# Patient Record
Sex: FEMALE | Race: WHITE | NOT HISPANIC OR LATINO | URBAN - METROPOLITAN AREA
[De-identification: names, ages, dates, MRNs, and addresses within clinical notes are randomized per-mention and may not be internally consistent; named-entity substitution may affect disease eponyms.]

---

## 2024-07-31 ENCOUNTER — HOSPITAL ENCOUNTER (EMERGENCY)
Facility: HOSPITAL | Age: 54
Discharge: OTHER INSTITUTION | End: 2024-08-01
Attending: EMERGENCY MEDICINE
Payer: COMMERCIAL

## 2024-07-31 ENCOUNTER — APPOINTMENT (OUTPATIENT)
Dept: CARDIOLOGY | Facility: HOSPITAL | Age: 54
End: 2024-07-31
Payer: COMMERCIAL

## 2024-07-31 VITALS
TEMPERATURE: 98.2 F | OXYGEN SATURATION: 96 % | WEIGHT: 160 LBS | HEART RATE: 57 BPM | RESPIRATION RATE: 18 BRPM | SYSTOLIC BLOOD PRESSURE: 143 MMHG | HEIGHT: 65 IN | DIASTOLIC BLOOD PRESSURE: 84 MMHG | BODY MASS INDEX: 26.66 KG/M2

## 2024-07-31 DIAGNOSIS — R45.851 DEPRESSION WITH SUICIDAL IDEATION: Primary | ICD-10-CM

## 2024-07-31 DIAGNOSIS — F32.A DEPRESSION WITH SUICIDAL IDEATION: Primary | ICD-10-CM

## 2024-07-31 LAB
ALBUMIN SERPL BCP-MCNC: 4.7 G/DL (ref 3.4–5)
ALP SERPL-CCNC: 91 U/L (ref 33–110)
ALT SERPL W P-5'-P-CCNC: 11 U/L (ref 7–45)
AMPHETAMINES UR QL SCN: ABNORMAL
ANION GAP SERPL CALC-SCNC: 15 MMOL/L (ref 10–20)
APAP SERPL-MCNC: <10 UG/ML
AST SERPL W P-5'-P-CCNC: 15 U/L (ref 9–39)
BARBITURATES UR QL SCN: ABNORMAL
BASOPHILS # BLD AUTO: 0.06 X10*3/UL (ref 0–0.1)
BASOPHILS NFR BLD AUTO: 0.9 %
BENZODIAZ UR QL SCN: ABNORMAL
BILIRUB SERPL-MCNC: 0.6 MG/DL (ref 0–1.2)
BUN SERPL-MCNC: 6 MG/DL (ref 6–23)
BZE UR QL SCN: ABNORMAL
CALCIUM SERPL-MCNC: 10 MG/DL (ref 8.6–10.3)
CANNABINOIDS UR QL SCN: ABNORMAL
CHLORIDE SERPL-SCNC: 104 MMOL/L (ref 98–107)
CO2 SERPL-SCNC: 24 MMOL/L (ref 21–32)
CREAT SERPL-MCNC: 0.9 MG/DL (ref 0.5–1.05)
EGFRCR SERPLBLD CKD-EPI 2021: 77 ML/MIN/1.73M*2
EOSINOPHIL # BLD AUTO: 0.19 X10*3/UL (ref 0–0.7)
EOSINOPHIL NFR BLD AUTO: 2.8 %
ERYTHROCYTE [DISTWIDTH] IN BLOOD BY AUTOMATED COUNT: 12.7 % (ref 11.5–14.5)
ETHANOL SERPL-MCNC: <10 MG/DL
FENTANYL+NORFENTANYL UR QL SCN: ABNORMAL
GLUCOSE SERPL-MCNC: 126 MG/DL (ref 74–99)
HCG UR QL IA.RAPID: NEGATIVE
HCT VFR BLD AUTO: 43.8 % (ref 36–46)
HGB BLD-MCNC: 15.1 G/DL (ref 12–16)
IMM GRANULOCYTES # BLD AUTO: 0 X10*3/UL (ref 0–0.7)
IMM GRANULOCYTES NFR BLD AUTO: 0 % (ref 0–0.9)
LYMPHOCYTES # BLD AUTO: 3.33 X10*3/UL (ref 1.2–4.8)
LYMPHOCYTES NFR BLD AUTO: 49.1 %
MCH RBC QN AUTO: 32.5 PG (ref 26–34)
MCHC RBC AUTO-ENTMCNC: 34.5 G/DL (ref 32–36)
MCV RBC AUTO: 94 FL (ref 80–100)
METHADONE UR QL SCN: ABNORMAL
MONOCYTES # BLD AUTO: 0.37 X10*3/UL (ref 0.1–1)
MONOCYTES NFR BLD AUTO: 5.5 %
NEUTROPHILS # BLD AUTO: 2.83 X10*3/UL (ref 1.2–7.7)
NEUTROPHILS NFR BLD AUTO: 41.7 %
NRBC BLD-RTO: 0 /100 WBCS (ref 0–0)
OPIATES UR QL SCN: ABNORMAL
OXYCODONE+OXYMORPHONE UR QL SCN: ABNORMAL
PCP UR QL SCN: ABNORMAL
PLATELET # BLD AUTO: 241 X10*3/UL (ref 150–450)
POTASSIUM SERPL-SCNC: 3.6 MMOL/L (ref 3.5–5.3)
PROT SERPL-MCNC: 7.4 G/DL (ref 6.4–8.2)
RBC # BLD AUTO: 4.65 X10*6/UL (ref 4–5.2)
SALICYLATES SERPL-MCNC: <3 MG/DL
SODIUM SERPL-SCNC: 139 MMOL/L (ref 136–145)
WBC # BLD AUTO: 6.8 X10*3/UL (ref 4.4–11.3)

## 2024-07-31 PROCEDURE — 81025 URINE PREGNANCY TEST: CPT | Performed by: EMERGENCY MEDICINE

## 2024-07-31 PROCEDURE — 2500000002 HC RX 250 W HCPCS SELF ADMINISTERED DRUGS (ALT 637 FOR MEDICARE OP, ALT 636 FOR OP/ED): Performed by: EMERGENCY MEDICINE

## 2024-07-31 PROCEDURE — 85025 COMPLETE CBC W/AUTO DIFF WBC: CPT | Performed by: EMERGENCY MEDICINE

## 2024-07-31 PROCEDURE — 80143 DRUG ASSAY ACETAMINOPHEN: CPT | Performed by: EMERGENCY MEDICINE

## 2024-07-31 PROCEDURE — 93005 ELECTROCARDIOGRAM TRACING: CPT

## 2024-07-31 PROCEDURE — 36415 COLL VENOUS BLD VENIPUNCTURE: CPT | Performed by: EMERGENCY MEDICINE

## 2024-07-31 PROCEDURE — 99285 EMERGENCY DEPT VISIT HI MDM: CPT

## 2024-07-31 PROCEDURE — 84075 ASSAY ALKALINE PHOSPHATASE: CPT | Performed by: EMERGENCY MEDICINE

## 2024-07-31 PROCEDURE — 80307 DRUG TEST PRSMV CHEM ANLYZR: CPT | Performed by: EMERGENCY MEDICINE

## 2024-07-31 PROCEDURE — S4991 NICOTINE PATCH NONLEGEND: HCPCS | Performed by: EMERGENCY MEDICINE

## 2024-07-31 RX ORDER — IBUPROFEN 200 MG
1 TABLET ORAL ONCE
Status: DISCONTINUED | OUTPATIENT
Start: 2024-07-31 | End: 2024-08-01 | Stop reason: HOSPADM

## 2024-07-31 RX ADMIN — NICOTINE 1 PATCH: 21 PATCH, EXTENDED RELEASE TRANSDERMAL at 19:20

## 2024-07-31 SDOH — HEALTH STABILITY: MENTAL HEALTH: HAVE YOU WISHED YOU WERE DEAD OR WISHED YOU COULD GO TO SLEEP AND NOT WAKE UP?: YES

## 2024-07-31 SDOH — HEALTH STABILITY: MENTAL HEALTH

## 2024-07-31 SDOH — HEALTH STABILITY: MENTAL HEALTH: NON-SPECIFIC ACTIVE SUICIDAL THOUGHTS (PAST 1 MONTH): YES

## 2024-07-31 SDOH — HEALTH STABILITY: MENTAL HEALTH: SUICIDAL BEHAVIOR (LIFETIME): YES

## 2024-07-31 SDOH — HEALTH STABILITY: MENTAL HEALTH: HAVE YOU BEEN THINKING ABOUT HOW YOU MIGHT DO THIS?: NO

## 2024-07-31 SDOH — HEALTH STABILITY: MENTAL HEALTH: HAVE YOU ACTUALLY HAD ANY THOUGHTS OF KILLING YOURSELF?: YES

## 2024-07-31 SDOH — HEALTH STABILITY: MENTAL HEALTH: HAVE YOU EVER DONE ANYTHING, STARTED TO DO ANYTHING, OR PREPARED TO DO ANYTHING TO END YOUR LIFE?: NO

## 2024-07-31 SDOH — ECONOMIC STABILITY: HOUSING INSECURITY: FEELS SAFE LIVING IN HOME: YES

## 2024-07-31 SDOH — HEALTH STABILITY: MENTAL HEALTH: SUICIDE ASSESSMENT: ADULT (C-SSRS)

## 2024-07-31 SDOH — HEALTH STABILITY: MENTAL HEALTH: ACTIVE SUICIDAL IDEATION WITH SOME INTENT TO ACT, WITHOUT SPECIFIC PLAN (PAST 1 MONTH): NO

## 2024-07-31 SDOH — HEALTH STABILITY: MENTAL HEALTH
HAVE YOU STARTED TO WORK OUT OR WORKED OUT THE DETAILS OF HOW TO KILL YOURSELF? DO YOU INTENT TO CARRY OUT THIS PLAN?: YES

## 2024-07-31 SDOH — HEALTH STABILITY: MENTAL HEALTH: ANXIETY SYMPTOMS: GENERALIZED

## 2024-07-31 SDOH — HEALTH STABILITY: MENTAL HEALTH: ACTIVE SUICIDAL IDEATION WITH SPECIFIC PLAN AND INTENT (PAST 1 MONTH): NO

## 2024-07-31 SDOH — HEALTH STABILITY: MENTAL HEALTH: BEHAVIORS/MOOD: ANXIOUS;ANGRY;CRYING

## 2024-07-31 SDOH — HEALTH STABILITY: MENTAL HEALTH: SUICIDAL BEHAVIOR (3 MONTHS): YES

## 2024-07-31 SDOH — HEALTH STABILITY: MENTAL HEALTH: WISH TO BE DEAD (PAST 1 MONTH): YES

## 2024-07-31 SDOH — HEALTH STABILITY: MENTAL HEALTH: HAVE YOU HAD THESE THOUGHTS AND HAD SOME INTENTION OF ACTING ON THEM?: NO

## 2024-07-31 SDOH — HEALTH STABILITY: MENTAL HEALTH
DEPRESSION SYMPTOMS: APPETITE CHANGE;CRYING;FEELINGS OF HELPLESSNESS;FEELINGS OF HOPELESSESS;FEELINGS OF WORTHLESSNESS;INCREASED IRRITABILITY;ISOLATIVE;LOSS OF INTEREST;SLEEP DISTURBANCE

## 2024-07-31 ASSESSMENT — COLUMBIA-SUICIDE SEVERITY RATING SCALE - C-SSRS
1. IN THE PAST MONTH, HAVE YOU WISHED YOU WERE DEAD OR WISHED YOU COULD GO TO SLEEP AND NOT WAKE UP?: YES
6. HAVE YOU EVER DONE ANYTHING, STARTED TO DO ANYTHING, OR PREPARED TO DO ANYTHING TO END YOUR LIFE?: YES
4. HAVE YOU HAD THESE THOUGHTS AND HAD SOME INTENTION OF ACTING ON THEM?: NO
2. HAVE YOU ACTUALLY HAD ANY THOUGHTS OF KILLING YOURSELF?: YES
5. HAVE YOU STARTED TO WORK OUT OR WORKED OUT THE DETAILS OF HOW TO KILL YOURSELF? DO YOU INTEND TO CARRY OUT THIS PLAN?: NO
6. HAVE YOU EVER DONE ANYTHING, STARTED TO DO ANYTHING, OR PREPARED TO DO ANYTHING TO END YOUR LIFE?: YES

## 2024-07-31 ASSESSMENT — LIFESTYLE VARIABLES
PRESCIPTION_ABUSE_PAST_12_MONTHS: NO
SUBSTANCE_ABUSE_PAST_12_MONTHS: NO

## 2024-07-31 ASSESSMENT — PAIN - FUNCTIONAL ASSESSMENT: PAIN_FUNCTIONAL_ASSESSMENT: 0-10

## 2024-07-31 ASSESSMENT — PAIN SCALES - GENERAL: PAINLEVEL_OUTOF10: 0 - NO PAIN

## 2024-07-31 NOTE — PROGRESS NOTES
EPAT - Social Work Psychiatric Assessment    Arrival Details  Mode of Arrival: Ambulance  Admission Source:  (Work)  Admission Type: Involuntary  EPAT Assessment Start Date: 07/31/24  EPAT Assessment Start Time: 1615  Name of : RAYA Larkin LSW    History of Present Illness  Admission Reason: Suicidal Ideation  HPI: Patient is a 53yo female presenting to the ED via EMS on a pink slip from work with chief complaint of suicidal ideation. Reportedly, “she made comments of wanting to harm herself by using a meat ignacio to cut her wrist and to drive head on with another vehicle”. Patient’s chart and triage reviewed, provider note not available. Patient has a psychiatric history of Anxiety and Depression, is currently on Citalopram. She is not connected with an outpatient provider and has no history of inpatient admissions. Patient denies history of self-harm or suicide attempts, indicated high risk at triage. UTOX and BAL not available, however, conversation conducted with ED staff prior to assessment indicated patient was clinically sober on arrival with no signs of intoxication.    SW Readmission Information   Readmission within 30 Days: No    Psychiatric Symptoms  Anxiety Symptoms: Generalized  Depression Symptoms: Appetite change, Crying, Feelings of helplessness, Feelings of hopelessess, Feelings of worthlessness, Increased irritability, Isolative, Loss of interest, Sleep disturbance  Ashley Symptoms: No problems reported or observed.    Psychosis Symptoms  Hallucination Type: No problems reported or observed.  Delusion Type: No problems reported or observed.    Additional Symptoms - Adult  Generalized Anxiety Disorder: Difficult to control worry, Easily fatigued, Excessive anxiety/worry, Irritability, Restlessness, Sleep disturbance  Obsessive Compulsive Disorder: No problems reported or observed.  Panic Attack: No problems reported or observed.  Post Traumatic Stress Disorder: No problems reported or  "observed.  Delirium: No problems reported or observed.    Past Psychiatric History/Meds/Treatments  Past Psychiatric History: Psychiatric Diagnosis: Anxiety, Depression // Current MH Center: none // Previous Admissions: denies  Past Psychiatric Meds/Treatments: Current medication: Citalopram 40mg  Past Violence/Victimization History: Denies    Current Mental Health Contacts   Name/Phone Number: None   Last Appointment Date: None  Provider Name/Phone Number: None - pt recently terminated services with her PCP so no longer has a medical or mental health provider  Provider Last Appointment Date: None    Support System:  (\"I don't need anybody\")    Living Arrangement: House, Lives with someone    Home Safety  Feels Safe Living in Home: Yes    Income Information  Employment Status for: Patient  Employment Status: Employed  Income Source: Employed  Current/Previous Occupation: Healthcare    [place] Service/Education History  Current or Previous  Service: None  Education Level: High school  History of Learning Problems: No  History of School Behavior Problems: No    Social/Cultural History  Social History: [place] Citizen: yes // Payee: None // Guardian/POA: Self  Cultural Requests During Hospitalization: none  Spiritual Requests During Hospitalization: none  Important Activities:  (\"Nothing\")    Legal  Legal Considerations: Patient/ Family Capacity to Make Sound Judgments  Criminal Activity/ Legal Involvement Pertinent to Current Situation/ Hospitalization: None  Legal Concerns: denies    Drug Screening  Have you used any substances (canabis, cocaine, heroin, hallucinogens, inhalants, etc.) in the past 12 months?: No  Have you used any prescription drugs other than prescribed in the past 12 months?: No  Is a toxicology screen needed?: Yes    Stage of Change  Stage of Change:  (n/a)    Psychosocial  Psychosocial (WDL): Exceptions to WDL  Behaviors/Mood: Anxious, Cooperative, Crying, " Irritable  Affect: Appropriate to circumstances  Parent/Guardian/Significant Other Involvement: Attentive to patient needs  Family Behaviors: Appropriate for situation    Orientation  Orientation Level: Oriented X4    General Appearance  Motor Activity: Unremarkable  Speech Pattern:  (Unremarkable)  General Attitude: Cooperative, Guarded  Appearance/Hygiene: Unremarkable    Thought Process  Coherency: [place] thinking  Content: Blaming self  Delusions:  (None)  Perception: Not altered  Hallucination: None  Judgment/Insight: Impaired  Confusion: None  Cognition: Poor judgement, Poor safety awareness, Appropriate attention/concentration    Sleep Pattern  Sleep Pattern: Disturbed/interrupted sleep, Restlessness    Risk Factors  Self Harm/Suicidal Ideation Plan: SI with plan to drive car into traffic or cut self with knife  Previous Self Harm/Suicidal Plans: Denies  Risk Factors: Major mental illness    Violence Risk Assessment  Assessment of Violence: None noted  Thoughts of Harm to Others: No    Ability to Assess Risk Screen  Risk Screen - Ability to Assess: Able to be screened  [place] Suicide Severity Rating Scale (Screener/Recent Self-Report)  1. Wish to be Dead (Past 1 Month): Yes  2. Non-Specific Active Suicidal Thoughts (Past 1 Month): Yes  3. Active Suicidal Ideation with any Methods (Not Plan) Without Intent to Act (Past 1 Month): Yes  4. Active Suicidal Ideation with Some Intent to Act, Without Specific Plan (Past 1 Month): No  5. Active Suicidal Ideation with Specific Plan and Intent (Past 1 Month): No  6. Suicidal Behavior (Lifetime): Yes  6. Suicidal Behavior (3 Months): Yes  6. Suicidal Behavior (Description): Pt reports holding knife with thoughts to cut wrists yesterday  Calculated C-SSRS Risk Score (Lifetime/Recent): High Risk  Step 1: Risk Factors  Current & Past Psychiatric Dx: Mood disorder  Presenting Symptoms: Anhedonia, Impulsivity, Hopelessness or despair, Anxiety and/or  panic  Precipitants/Stressors: Chronic physical pain or other acute medical problem (e.g. CNS disorders), Social isolation, Perceived burden on others (24/7 tinnitus 2/2 meniere's disease)  Change in Treatment: Non-compliant or not receiving treatment  Access to Lethal Methods : No  Step 2: Protective Factors   Protective Factors Internal: Identifies reasons for living  Protective Factors External: Responsibility to children  Step 3: Suicidal Ideation Intensity  How Many Times Have You Had These Thoughts: Many times each day  When You Have the Thoughts How Long do They Last : 4-8 hours/most of the day  Could/Can You Stop Thinking About Killing Yourself or Wanting to Die if You Want to: Can control thoughts with some difficulty  Are There Things - Anyone or Anything - That Stopped You From Wanting to Die or Acting on: Uncertain that deterrents stopped you  What Sort of Reasons Did You Have For Thinking About Wanting to Die or Killing Yourself: Equally to get attention, revenge or a reaction from others and to end/stop the pain  Total Score: 18  Step 5: Documentation  Risk Level: Moderate suicide risk (Patient remained an elevated risk, continued SI with plan. However, this risk is able to be mitigated in the setting of an inpatient setting & pt is currently considered moderate risk with plan for inpatient admission. Discussed w/ Dr. Baron)    Psychiatric Impression and Plan of Care  Assessment and Plan:    Upon assessment the patient was withdrawn, guarded, and irritable but remained in behavioral control. Patient presented as acutely dysthymic, anhedonic, and was crying throughout. The patient reports she is here because “I said things I shouldn't have and now I know not to tell anyone anything ever again”. Patient continued to report SI with plan to “run my car into traffic” or cut herself with a  knife. She reported “I could have done it yesterday, too. I was washing dishes and had a knife in my hand  and could have just slit my wrist right then and there”. When asked what had stopped her, patient responded “if I wasn't successful then I would have an even bigger mess to clean up” and “I guess my family too”. The patient otherwise denied HI/AVH and no delusions were elicited. She was unable to state a specific trigger or stressor to current SI, just that “it's always the same sh*t, my sh*tty life”. 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 She endorses feeling safe at home and does own a firearm. When asked regarding social supports, patient stated “I don't need anybody, so no one. I don't need to be here. Nobody!”. Patient reports 6-7 hours of interrupted sleep per night, reporting it is not restful sleep, and no appetite recently. She endorses high rates of anxiety to the point where she feels debilitated and unable to do anything but go to work and then go back home. Despite requesting to be discharged, patient failed to remain future/goal-oriented and was unable to appropriately participate in efforts to safety plan. The patient reluctantly agreed for this writer to speak with her , reporting that he has not been aware of the severity of her symptoms, “but I guess he'll find out one way or another when I don't come home tonight” - pt declined to elaborate on whether this statement was in reference to her harming herself if she were to be discharged home.     This writer spoke with patient's , Bam Michele. He reports that patient “has not been herself” for about the past week. Patient has been increasingly withdrawn, quiet, irritable, and has demonstrated “no will to do anything”. He expressed belief that patient may benefit from a medication change as she has been on the same medication since they first started dating. He does endorse patient has a firearm and reports that it is not currently locked up  "or secured. He endorses ability to secure it and remove it from the home regardless of patient's disposition.     Diagnostic Impression: Unspecified Depressive Disorder, Unspecified Anxiety Disorder     Psychiatric Impression and Plan for Care: Patient continues to present as an acute risk to self, recommendation for admission discussed with Dr. Baron who is in agreement.     Specific Resources Provided to Patient: Admission    Outcome/Disposition  Patient's Perception of Outcome Achieved: \"I want to go home\"  Assessment, Recommendations and Risk Level Reviewed with: Dr. Baron  Contact Name: Bam Bautista  Contact Number(s): [phone]  Contact Relationship: Spouse  EPAT Assessment Completed Date: 07/31/24  EPAT Assessment Completed Time: 1803      "

## 2024-07-31 NOTE — ED TRIAGE NOTES
Pt presents to ED via EMS for endorsing SI at work. She made comments of wanting to harm herself by using a meat ignacio to cut her wrist and to drive head on with another vehicle. Pt I being pink slipped by [employer] police. Pt states that she hates her life and is miserable. PMH of anxiety and depression.

## 2024-07-31 NOTE — ED PROVIDER NOTES
HPI   Chief Complaint   Patient presents with    Suicidal     Pt brought in via EMS due to endorsing SI at work. She told her coworkers that she wanted to cause harm to herself by using a meat ignacio to cut her wrist and/or drive head on with another vehicle.        54-year-old woman with past medical history of depression and anxiety does present under pink slip by [place] police status post making threats at work.  Threatening to cut her wrists and kill herself.  She denies any active attempts.  She does appear to have worsening depression and anxiety and continues to make threats here in the ED.            Patient History   Past Medical History:   Diagnosis Date    Abrasion of breast, unspecified breast, initial encounter     Breast abrasion    Other specified postprocedural states     Post-operative state    Overweight     Overweight (BMI 25.0-29.9)    Personal history of diseases of the skin and subcutaneous tissue     History of cyst of breast    Personal history of other diseases of the digestive system     History of constipation    Personal history of other medical treatment     History of ultrasound, pelvic    Personal history of other mental and behavioral disorders     History of depression    Personal history of other specified conditions     History of abdominal pain    Unspecified disorder of synovium and tendon, unspecified shoulder     Rotator cuff disorder    Unspecified thoracic, thoracolumbar and lumbosacral intervertebral disc disorder     Disc disorder     Past Surgical History:   Procedure Laterality Date    HYSTEROSCOPY  10/20/2014    Hysteroscopy With Endometrial Ablation    INCISIONAL BREAST BIOPSY  10/20/2014    Incisional Breast Biopsy    LUMBAR LAMINECTOMY  10/18/2016    Laminectomy Lumbar    TONSILLECTOMY  09/24/2014    Tonsillectomy    TUBAL LIGATION  10/20/2014    Tubal Ligation     No family history on file.  Social History     Tobacco Use    Smoking status: Not on file     Smokeless tobacco: Not on file   Substance Use Topics    Alcohol use: Not on file    Drug use: Not on file       Physical Exam   ED Triage Vitals [07/31/24 1552]   Temperature Heart Rate Respirations BP   36.7 °C (98.1 °F) (!) 117 18 (!) 166/99      Pulse Ox Temp Source Heart Rate Source Patient Position   99 % Oral Monitor Sitting      BP Location FiO2 (%)     Left arm --       Physical Exam  Vitals and nursing note reviewed.   Constitutional:       Appearance: Normal appearance. She is normal weight.   HENT:      Head: Normocephalic and atraumatic.      Nose: Nose normal.      Mouth/Throat:      Mouth: Mucous membranes are moist.      Pharynx: Oropharynx is clear.   Eyes:      Extraocular Movements: Extraocular movements intact.      Conjunctiva/sclera: Conjunctivae normal.      Pupils: Pupils are equal, round, and reactive to light.   Cardiovascular:      Rate and Rhythm: Normal rate and regular rhythm.      Pulses: Normal pulses.      Heart sounds: Normal heart sounds.   Pulmonary:      Breath sounds: Normal breath sounds.   Abdominal:      General: Abdomen is flat. Bowel sounds are normal.      Palpations: Abdomen is soft.   Musculoskeletal:         General: Normal range of motion.      Cervical back: Normal range of motion and neck supple.   Skin:     General: Skin is warm and dry.      Capillary Refill: Capillary refill takes less than 2 seconds.   Neurological:      General: No focal deficit present.      Mental Status: She is alert and oriented to person, place, and time. Mental status is at baseline.      Sensory: No sensory deficit.      Motor: No weakness.   Psychiatric:      Comments: Positive ongoing suicidal ideation           ED Course & MDM   Diagnoses as of 07/31/24 1916   Depression with suicidal ideation                       [place] Coma Scale Score: 15                        Medical Decision Making  Labs are drawn including CBC and CMP and tox panel.  All negative.  Drug screen is pending.  She  does not have any signs for acute kidney injury.  No metabolic and neck acidosis.  Blood pressure is stable.  Patient is medically cleared for psychiatric evaluation.  She is evaluated by EPAT he does feel she requires inpatient hospitalization.    EKG interpreted by me showed normal sinus rhythm at a rate of 70 with no acute ischemic changes.  No STEMI.  No A-fib.    Amount and/or Complexity of Data Reviewed  Labs: ordered. Decision-making details documented in ED Course.  Radiology: ordered. Decision-making details documented in ED Course.        Procedure  Procedures     Haroon Baron MD  07/31/24 2929

## 2024-07-31 NOTE — ED NOTES
Pts belongings in locker #3, pants, shirt, purse, shoes and phone     Mandy Faith RN  07/31/24 2378

## 2024-08-01 ENCOUNTER — HOSPITAL ENCOUNTER (INPATIENT)
Facility: HOSPITAL | Age: 54
Discharge: HOME | DRG: 885 | End: 2024-08-01
Attending: PSYCHIATRY & NEUROLOGY | Admitting: PSYCHIATRY & NEUROLOGY
Payer: COMMERCIAL

## 2024-08-01 DIAGNOSIS — F17.200 TOBACCO DEPENDENCE: ICD-10-CM

## 2024-08-01 DIAGNOSIS — R45.851 SUICIDAL IDEATION: Primary | ICD-10-CM

## 2024-08-01 LAB
25(OH)D3 SERPL-MCNC: 17 NG/ML (ref 30–100)
ATRIAL RATE: 70 BPM
CHOLEST SERPL-MCNC: 200 MG/DL (ref 0–199)
CHOLESTEROL/HDL RATIO: 4.8
GLUCOSE P FAST SERPL-MCNC: 92 MG/DL (ref 74–99)
HDLC SERPL-MCNC: 42.1 MG/DL
LDLC SERPL CALC-MCNC: 137 MG/DL
NON HDL CHOLESTEROL: 158 MG/DL (ref 0–149)
P AXIS: 150 DEGREES
P OFFSET: 205 MS
P ONSET: 158 MS
PR INTERVAL: 132 MS
Q ONSET: 224 MS
QRS COUNT: 12 BEATS
QRS DURATION: 78 MS
QT INTERVAL: 412 MS
QTC CALCULATION(BAZETT): 444 MS
QTC FREDERICIA: 433 MS
R AXIS: -20 DEGREES
T AXIS: 138 DEGREES
T OFFSET: 430 MS
TRIGL SERPL-MCNC: 105 MG/DL (ref 0–149)
VENTRICULAR RATE: 70 BPM
VLDL: 21 MG/DL (ref 0–40)

## 2024-08-01 PROCEDURE — 80061 LIPID PANEL: CPT | Performed by: PSYCHIATRY & NEUROLOGY

## 2024-08-01 PROCEDURE — 36415 COLL VENOUS BLD VENIPUNCTURE: CPT | Performed by: PSYCHIATRY & NEUROLOGY

## 2024-08-01 PROCEDURE — 2500000002 HC RX 250 W HCPCS SELF ADMINISTERED DRUGS (ALT 637 FOR MEDICARE OP, ALT 636 FOR OP/ED): Performed by: INTERNAL MEDICINE

## 2024-08-01 PROCEDURE — 2500000001 HC RX 250 WO HCPCS SELF ADMINISTERED DRUGS (ALT 637 FOR MEDICARE OP): Performed by: PSYCHIATRY & NEUROLOGY

## 2024-08-01 PROCEDURE — 99223 1ST HOSP IP/OBS HIGH 75: CPT | Performed by: PSYCHIATRY & NEUROLOGY

## 2024-08-01 PROCEDURE — 82306 VITAMIN D 25 HYDROXY: CPT | Mod: GEALAB | Performed by: PSYCHIATRY & NEUROLOGY

## 2024-08-01 PROCEDURE — 99221 1ST HOSP IP/OBS SF/LOW 40: CPT | Performed by: INTERNAL MEDICINE

## 2024-08-01 PROCEDURE — 82947 ASSAY GLUCOSE BLOOD QUANT: CPT | Performed by: PSYCHIATRY & NEUROLOGY

## 2024-08-01 PROCEDURE — S4991 NICOTINE PATCH NONLEGEND: HCPCS | Performed by: INTERNAL MEDICINE

## 2024-08-01 PROCEDURE — 1240000001 HC SEMI-PRIVATE BH ROOM DAILY

## 2024-08-01 RX ORDER — OLANZAPINE 5 MG/1
10 TABLET ORAL EVERY 6 HOURS PRN
Status: DISCONTINUED | OUTPATIENT
Start: 2024-08-01 | End: 2024-08-01

## 2024-08-01 RX ORDER — TALC
3 POWDER (GRAM) TOPICAL NIGHTLY PRN
Status: DISCONTINUED | OUTPATIENT
Start: 2024-08-01 | End: 2024-08-05 | Stop reason: HOSPADM

## 2024-08-01 RX ORDER — ALPRAZOLAM 0.5 MG/1
0.5 TABLET ORAL 2 TIMES DAILY PRN
Status: DISCONTINUED | OUTPATIENT
Start: 2024-08-01 | End: 2024-08-05 | Stop reason: HOSPADM

## 2024-08-01 RX ORDER — OLANZAPINE 10 MG/2ML
5 INJECTION, POWDER, FOR SOLUTION INTRAMUSCULAR EVERY 6 HOURS PRN
Status: DISCONTINUED | OUTPATIENT
Start: 2024-08-01 | End: 2024-08-01

## 2024-08-01 RX ORDER — OLANZAPINE 5 MG/1
5 TABLET ORAL EVERY 6 HOURS PRN
Status: DISCONTINUED | OUTPATIENT
Start: 2024-08-01 | End: 2024-08-01

## 2024-08-01 RX ORDER — ACETAMINOPHEN 325 MG/1
650 TABLET ORAL ONCE
Status: COMPLETED | OUTPATIENT
Start: 2024-08-01 | End: 2024-08-01

## 2024-08-01 RX ORDER — OLANZAPINE 10 MG/2ML
10 INJECTION, POWDER, FOR SOLUTION INTRAMUSCULAR EVERY 6 HOURS PRN
Status: DISCONTINUED | OUTPATIENT
Start: 2024-08-01 | End: 2024-08-01

## 2024-08-01 RX ORDER — LORAZEPAM 2 MG/ML
2 INJECTION INTRAMUSCULAR EVERY 6 HOURS PRN
Status: DISCONTINUED | OUTPATIENT
Start: 2024-08-01 | End: 2024-08-01

## 2024-08-01 RX ORDER — TRIAMTERENE/HYDROCHLOROTHIAZID 37.5-25 MG
1 TABLET ORAL
COMMUNITY
Start: 2024-07-25

## 2024-08-01 RX ORDER — MIRTAZAPINE 15 MG/1
15 TABLET, FILM COATED ORAL NIGHTLY
Status: DISCONTINUED | OUTPATIENT
Start: 2024-08-01 | End: 2024-08-05 | Stop reason: HOSPADM

## 2024-08-01 RX ORDER — IBUPROFEN 200 MG
1 TABLET ORAL DAILY
Status: DISCONTINUED | OUTPATIENT
Start: 2024-08-01 | End: 2024-08-05 | Stop reason: HOSPADM

## 2024-08-01 RX ORDER — ALUMINUM HYDROXIDE, MAGNESIUM HYDROXIDE, AND SIMETHICONE 1200; 120; 1200 MG/30ML; MG/30ML; MG/30ML
30 SUSPENSION ORAL EVERY 6 HOURS PRN
Status: DISCONTINUED | OUTPATIENT
Start: 2024-08-01 | End: 2024-08-05 | Stop reason: HOSPADM

## 2024-08-01 RX ORDER — CITALOPRAM 40 MG/1
40 TABLET, FILM COATED ORAL
COMMUNITY
Start: 2015-01-19 | End: 2024-08-05 | Stop reason: HOSPADM

## 2024-08-01 RX ORDER — LORAZEPAM 1 MG/1
2 TABLET ORAL EVERY 6 HOURS PRN
Status: DISCONTINUED | OUTPATIENT
Start: 2024-08-01 | End: 2024-08-01

## 2024-08-01 RX ORDER — BUPROPION HYDROCHLORIDE 75 MG/1
75 TABLET ORAL ONCE
Status: COMPLETED | OUTPATIENT
Start: 2024-08-01 | End: 2024-08-01

## 2024-08-01 RX ORDER — CITALOPRAM 20 MG/1
40 TABLET, FILM COATED ORAL
Status: DISCONTINUED | OUTPATIENT
Start: 2024-08-01 | End: 2024-08-01

## 2024-08-01 RX ORDER — TRIAMTERENE/HYDROCHLOROTHIAZID 37.5-25 MG
1 TABLET ORAL
Status: DISCONTINUED | OUTPATIENT
Start: 2024-08-01 | End: 2024-08-05 | Stop reason: HOSPADM

## 2024-08-01 RX ORDER — DIPHENHYDRAMINE HYDROCHLORIDE 50 MG/ML
50 INJECTION INTRAMUSCULAR; INTRAVENOUS ONCE AS NEEDED
Status: DISCONTINUED | OUTPATIENT
Start: 2024-08-01 | End: 2024-08-05 | Stop reason: HOSPADM

## 2024-08-01 RX ORDER — DIPHENHYDRAMINE HCL 50 MG
50 CAPSULE ORAL EVERY 6 HOURS PRN
Status: DISCONTINUED | OUTPATIENT
Start: 2024-08-01 | End: 2024-08-05 | Stop reason: HOSPADM

## 2024-08-01 RX ORDER — BUPROPION HYDROCHLORIDE 100 MG/1
100 TABLET, EXTENDED RELEASE ORAL 2 TIMES DAILY
Status: DISCONTINUED | OUTPATIENT
Start: 2024-08-02 | End: 2024-08-05 | Stop reason: HOSPADM

## 2024-08-01 RX ADMIN — ACETAMINOPHEN 650 MG: 325 TABLET ORAL at 00:47

## 2024-08-01 SDOH — ECONOMIC STABILITY: HOUSING INSECURITY: IN THE PAST 12 MONTHS, HOW MANY TIMES HAVE YOU MOVED WHERE YOU WERE LIVING?: 1

## 2024-08-01 SDOH — SOCIAL STABILITY: SOCIAL INSECURITY: WERE YOU ABLE TO COMPLETE ALL THE BEHAVIORAL HEALTH SCREENINGS?: YES

## 2024-08-01 SDOH — ECONOMIC STABILITY: TRANSPORTATION INSECURITY
IN THE PAST 12 MONTHS, HAS THE LACK OF TRANSPORTATION KEPT YOU FROM MEDICAL APPOINTMENTS OR FROM GETTING MEDICATIONS?: NO

## 2024-08-01 SDOH — HEALTH STABILITY: PHYSICAL HEALTH: ON AVERAGE, HOW MANY MINUTES DO YOU ENGAGE IN EXERCISE AT THIS LEVEL?: 60 MIN

## 2024-08-01 SDOH — SOCIAL STABILITY: SOCIAL INSECURITY

## 2024-08-01 SDOH — SOCIAL STABILITY: SOCIAL INSECURITY: DOES ANYONE TRY TO KEEP YOU FROM HAVING/CONTACTING OTHER FRIENDS OR DOING THINGS OUTSIDE YOUR HOME?: NO

## 2024-08-01 SDOH — SOCIAL STABILITY: SOCIAL INSECURITY
WITHIN THE LAST YEAR, HAVE YOU BEEN KICKED, HIT, SLAPPED, OR OTHERWISE PHYSICALLY HURT BY YOUR PARTNER OR EX-PARTNER?: NO

## 2024-08-01 SDOH — SOCIAL STABILITY: SOCIAL NETWORK: IN A TYPICAL WEEK, HOW MANY TIMES DO YOU TALK ON THE PHONE WITH FAMILY, FRIENDS, OR NEIGHBORS?: ONCE A WEEK

## 2024-08-01 SDOH — SOCIAL STABILITY: SOCIAL NETWORK
DO YOU BELONG TO ANY CLUBS OR ORGANIZATIONS SUCH AS CHURCH GROUPS UNIONS, FRATERNAL OR ATHLETIC GROUPS, OR SCHOOL GROUPS?: NO

## 2024-08-01 SDOH — SOCIAL STABILITY: SOCIAL INSECURITY: ARE THERE ANY APPARENT SIGNS OF INJURIES/BEHAVIORS THAT COULD BE RELATED TO ABUSE/NEGLECT?: NO

## 2024-08-01 SDOH — ECONOMIC STABILITY: HOUSING INSECURITY: AT ANY TIME IN THE PAST 12 MONTHS, WERE YOU HOMELESS OR LIVING IN A SHELTER (INCLUDING NOW)?: NO

## 2024-08-01 SDOH — ECONOMIC STABILITY: INCOME INSECURITY: HOW HARD IS IT FOR YOU TO PAY FOR THE VERY BASICS LIKE FOOD, HOUSING, MEDICAL CARE, AND HEATING?: HARD

## 2024-08-01 SDOH — SOCIAL STABILITY: SOCIAL INSECURITY
WITHIN THE LAST YEAR, HAVE TO BEEN RAPED OR FORCED TO HAVE ANY KIND OF SEXUAL ACTIVITY BY YOUR PARTNER OR EX-PARTNER?: NO

## 2024-08-01 SDOH — HEALTH STABILITY: MENTAL HEALTH
HOW OFTEN DO YOU NEED TO HAVE SOMEONE HELP YOU WHEN YOU READ INSTRUCTIONS, PAMPHLETS, OR OTHER WRITTEN MATERIAL FROM YOUR DOCTOR OR PHARMACY?: NEVER

## 2024-08-01 SDOH — HEALTH STABILITY: MENTAL HEALTH: EXPERIENCED ANY OF THE FOLLOWING LIFE EVENTS: SOCIAL LOSS (BANKRUPTCY, DIVORCE, WORK-RELATED STRESS)

## 2024-08-01 SDOH — SOCIAL STABILITY: SOCIAL INSECURITY: HAVE YOU HAD ANY THOUGHTS OF HARMING ANYONE ELSE?: NO

## 2024-08-01 SDOH — SOCIAL STABILITY: SOCIAL NETWORK: ARE YOU MARRIED, WIDOWED, DIVORCED, SEPARATED, NEVER MARRIED, OR LIVING WITH A PARTNER?: MARRIED

## 2024-08-01 SDOH — HEALTH STABILITY: MENTAL HEALTH: HAVE YOU BEEN THINKING ABOUT HOW YOU MIGHT DO THIS?: NO

## 2024-08-01 SDOH — SOCIAL STABILITY: SOCIAL NETWORK: VISITOR BEHAVIORS: APPROPRIATE FOR SITUATION

## 2024-08-01 SDOH — HEALTH STABILITY: MENTAL HEALTH: SLEEP PATTERN: OTHER (COMMENT)

## 2024-08-01 SDOH — HEALTH STABILITY: MENTAL HEALTH: HAVE YOU HAD THESE THOUGHTS AND HAD SOME INTENTION OF ACTING ON THEM?: NO

## 2024-08-01 SDOH — ECONOMIC STABILITY: INCOME INSECURITY: IN THE PAST 12 MONTHS, HAS THE ELECTRIC, GAS, OIL, OR WATER COMPANY THREATENED TO SHUT OFF SERVICE IN YOUR HOME?: YES

## 2024-08-01 SDOH — ECONOMIC STABILITY: INCOME INSECURITY: IN THE LAST 12 MONTHS, WAS THERE A TIME WHEN YOU WERE NOT ABLE TO PAY THE MORTGAGE OR RENT ON TIME?: YES

## 2024-08-01 SDOH — SOCIAL STABILITY: SOCIAL NETWORK: HOW OFTEN DO YOU ATTEND CHURCH OR RELIGIOUS SERVICES?: NEVER

## 2024-08-01 SDOH — SOCIAL STABILITY: SOCIAL INSECURITY: DO YOU FEEL UNSAFE GOING BACK TO THE PLACE WHERE YOU ARE LIVING?: NO

## 2024-08-01 SDOH — ECONOMIC STABILITY: FOOD INSECURITY: WITHIN THE PAST 12 MONTHS, YOU WORRIED THAT YOUR FOOD WOULD RUN OUT BEFORE YOU GOT MONEY TO BUY MORE.: OFTEN TRUE

## 2024-08-01 SDOH — HEALTH STABILITY: MENTAL HEALTH
EXPERIENCED ANY OF THE FOLLOWING LIFE EVENTS: DEATH OF FAMILY/FRIEND;PHYSICAL ASSAULT;SOCIAL LOSS (BANKRUPTCY, DIVORCE, WORK-RELATED STRESS);OTHER (COMMENT)

## 2024-08-01 SDOH — SOCIAL STABILITY: SOCIAL INSECURITY: WITHIN THE LAST YEAR, HAVE YOU BEEN AFRAID OF YOUR PARTNER OR EX-PARTNER?: NO

## 2024-08-01 SDOH — SOCIAL STABILITY: SOCIAL NETWORK: HOW OFTEN DO YOU GET TOGETHER WITH FRIENDS OR RELATIVES?: ONCE A WEEK

## 2024-08-01 SDOH — HEALTH STABILITY: MENTAL HEALTH: HOW OFTEN DO YOU HAVE 6 OR MORE DRINKS ON ONE OCCASION?: LESS THAN MONTHLY

## 2024-08-01 SDOH — SOCIAL STABILITY: SOCIAL INSECURITY: ABUSE: ADULT

## 2024-08-01 SDOH — ECONOMIC STABILITY: FOOD INSECURITY: WITHIN THE PAST 12 MONTHS, THE FOOD YOU BOUGHT JUST DIDN'T LAST AND YOU DIDN'T HAVE MONEY TO GET MORE.: OFTEN TRUE

## 2024-08-01 SDOH — HEALTH STABILITY: MENTAL HEALTH
STRESS IS WHEN SOMEONE FEELS TENSE, NERVOUS, ANXIOUS, OR CAN'T SLEEP AT NIGHT BECAUSE THEIR MIND IS TROUBLED. HOW STRESSED ARE YOU?: VERY MUCH

## 2024-08-01 SDOH — SOCIAL STABILITY: SOCIAL INSECURITY: HAS ANYONE EVER THREATENED TO HURT YOUR FAMILY OR YOUR PETS?: NO

## 2024-08-01 SDOH — SOCIAL STABILITY: SOCIAL INSECURITY: ARE YOU OR HAVE YOU BEEN THREATENED OR ABUSED PHYSICALLY, EMOTIONALLY, OR SEXUALLY BY ANYONE?: NO

## 2024-08-01 SDOH — HEALTH STABILITY: MENTAL HEALTH: HAVE YOU ACTUALLY HAD ANY THOUGHTS OF KILLING YOURSELF?: NO

## 2024-08-01 SDOH — HEALTH STABILITY: MENTAL HEALTH

## 2024-08-01 SDOH — ECONOMIC STABILITY: TRANSPORTATION INSECURITY
IN THE PAST 12 MONTHS, HAS LACK OF TRANSPORTATION KEPT YOU FROM MEETINGS, WORK, OR FROM GETTING THINGS NEEDED FOR DAILY LIVING?: NO

## 2024-08-01 SDOH — HEALTH STABILITY: MENTAL HEALTH: HAVE YOU WISHED YOU WERE DEAD OR WISHED YOU COULD GO TO SLEEP AND NOT WAKE UP?: YES

## 2024-08-01 SDOH — HEALTH STABILITY: MENTAL HEALTH: DELUSIONS: CONTROLLED

## 2024-08-01 SDOH — SOCIAL STABILITY: SOCIAL INSECURITY: HAVE YOU HAD THOUGHTS OF HARMING ANYONE ELSE?: NO

## 2024-08-01 SDOH — SOCIAL STABILITY: SOCIAL INSECURITY: DO YOU FEEL ANYONE HAS EXPLOITED OR TAKEN ADVANTAGE OF YOU FINANCIALLY OR OF YOUR PERSONAL PROPERTY?: NO

## 2024-08-01 SDOH — SOCIAL STABILITY: SOCIAL NETWORK: HOW OFTEN DO YOU ATTENT MEETINGS OF THE CLUB OR ORGANIZATION YOU BELONG TO?: NEVER

## 2024-08-01 SDOH — HEALTH STABILITY: MENTAL HEALTH
HAVE YOU STARTED TO WORK OUT OR WORKED OUT THE DETAILS OF HOW TO KILL YOURSELF? DO YOU INTENT TO CARRY OUT THIS PLAN?: NO

## 2024-08-01 SDOH — SOCIAL STABILITY: SOCIAL INSECURITY: ARE YOU OR HAVE YOU BEEN THREATENED OR ABUSED PHYSICALLY, EMOTIONALLY, OR SEXUALLY BY ANYONE?: YES

## 2024-08-01 SDOH — SOCIAL STABILITY: SOCIAL INSECURITY: WITHIN THE LAST YEAR, HAVE YOU BEEN HUMILIATED OR EMOTIONALLY ABUSED IN OTHER WAYS BY YOUR PARTNER OR EX-PARTNER?: NO

## 2024-08-01 SDOH — SOCIAL STABILITY: SOCIAL NETWORK: EMOTIONAL SUPPORT GIVEN: REASSURE

## 2024-08-01 SDOH — SOCIAL STABILITY: SOCIAL INSECURITY: FAMILY BEHAVIORS: APPROPRIATE FOR SITUATION

## 2024-08-01 SDOH — HEALTH STABILITY: MENTAL HEALTH: NEEDS EXPRESSED: DENIES

## 2024-08-01 SDOH — HEALTH STABILITY: MENTAL HEALTH: HAVE YOU EVER DONE ANYTHING, STARTED TO DO ANYTHING, OR PREPARED TO DO ANYTHING TO END YOUR LIFE?: NO

## 2024-08-01 SDOH — HEALTH STABILITY: PHYSICAL HEALTH: ON AVERAGE, HOW MANY DAYS PER WEEK DO YOU ENGAGE IN MODERATE TO STRENUOUS EXERCISE (LIKE A BRISK WALK)?: 5 DAYS

## 2024-08-01 SDOH — HEALTH STABILITY: MENTAL HEALTH: SUICIDE ASSESSMENT: ADULT (C-SSRS)

## 2024-08-01 ASSESSMENT — PAIN SCALES - GENERAL
PAINLEVEL_OUTOF10: 5 - MODERATE PAIN
PAINLEVEL_OUTOF10: 0 - NO PAIN

## 2024-08-01 ASSESSMENT — LIFESTYLE VARIABLES
HOW MANY STANDARD DRINKS CONTAINING ALCOHOL DO YOU HAVE ON A TYPICAL DAY: 1 OR 2
PRESCIPTION_ABUSE_PAST_12_MONTHS: NO
ANXIETY: NO ANXIETY, AT EASE
HOW OFTEN DO YOU HAVE 6 OR MORE DRINKS ON ONE OCCASION: LESS THAN MONTHLY
AUDITORY DISTURBANCES: NOT PRESENT
TOTAL_SCORE: 0
SKIP TO QUESTIONS 9-10: 0
NAUSEA AND VOMITING: NO NAUSEA AND NO VOMITING
TREMOR: NO TREMOR
CIWA TOTAL SCORE: 0
AUDIT-C TOTAL SCORE: 2
HOW OFTEN DO YOU HAVE A DRINK CONTAINING ALCOHOL: MONTHLY OR LESS
HEADACHE, FULLNESS IN HEAD: NOT PRESENT
AUDIT-C TOTAL SCORE: 2
AGITATION: NORMAL ACTIVITY
HOW MANY STANDARD DRINKS CONTAINING ALCOHOL DO YOU HAVE ON A TYPICAL DAY: PATIENT DOES NOT DRINK
SUBSTANCE_ABUSE_PAST_12_MONTHS: YES
VISUAL DISTURBANCES: NOT PRESENT
PRESCIPTION_ABUSE_PAST_12_MONTHS: NO
BLOOD PRESSURE: 173/83
ORIENTATION AND CLOUDING OF SENSORIUM: ORIENTED AND CAN DO SERIAL ADDITIONS
PAROXYSMAL SWEATS: NO SWEAT VISIBLE
SUBSTANCE_ABUSE_PAST_12_MONTHS: NO

## 2024-08-01 ASSESSMENT — PATIENT HEALTH QUESTIONNAIRE - PHQ9
1. LITTLE INTEREST OR PLEASURE IN DOING THINGS: NEARLY EVERY DAY
1. LITTLE INTEREST OR PLEASURE IN DOING THINGS: NEARLY EVERY DAY
2. FEELING DOWN, DEPRESSED OR HOPELESS: NEARLY EVERY DAY
2. FEELING DOWN, DEPRESSED OR HOPELESS: NEARLY EVERY DAY
SUM OF ALL RESPONSES TO PHQ9 QUESTIONS 1 & 2: 6
SUM OF ALL RESPONSES TO PHQ9 QUESTIONS 1 & 2: 6

## 2024-08-01 ASSESSMENT — PAIN SCALES - WONG BAKER
WONGBAKER_NUMERICALRESPONSE: NO HURT
WONGBAKER_NUMERICALRESPONSE: HURTS LITTLE BIT

## 2024-08-01 ASSESSMENT — ACTIVITIES OF DAILY LIVING (ADL)
WALKS IN HOME: INDEPENDENT
ADEQUATE_TO_COMPLETE_ADL: YES
PATIENT'S MEMORY ADEQUATE TO SAFELY COMPLETE DAILY ACTIVITIES?: YES
FEEDING YOURSELF: INDEPENDENT
HEARING - RIGHT EAR: FUNCTIONAL
GROOMING: INDEPENDENT
HEARING - LEFT EAR: FUNCTIONAL
DRESSING YOURSELF: INDEPENDENT
BATHING: INDEPENDENT
LACK_OF_TRANSPORTATION: NO
TOILETING: INDEPENDENT
JUDGMENT_ADEQUATE_SAFELY_COMPLETE_DAILY_ACTIVITIES: YES

## 2024-08-01 ASSESSMENT — COLUMBIA-SUICIDE SEVERITY RATING SCALE - C-SSRS
6. HAVE YOU EVER DONE ANYTHING, STARTED TO DO ANYTHING, OR PREPARED TO DO ANYTHING TO END YOUR LIFE?: NO
2. HAVE YOU ACTUALLY HAD ANY THOUGHTS OF KILLING YOURSELF?: NO
1. SINCE LAST CONTACT, HAVE YOU WISHED YOU WERE DEAD OR WISHED YOU COULD GO TO SLEEP AND NOT WAKE UP?: NO

## 2024-08-01 ASSESSMENT — PAIN - FUNCTIONAL ASSESSMENT
PAIN_FUNCTIONAL_ASSESSMENT: 0-10

## 2024-08-01 ASSESSMENT — PAIN DESCRIPTION - ORIENTATION: ORIENTATION: POSTERIOR

## 2024-08-01 ASSESSMENT — PAIN DESCRIPTION - LOCATION: LOCATION: HEAD

## 2024-08-01 NOTE — NURSING NOTE
"Patient out on the unit and social with peers this shift. Rated anxiety 10/10 and depression 10/10. Denied SI/HI. Denied auditory/visual/other hallucinations. No complaints of pain or discomfort. No PRNs given. Medication compliant. Patient attended group therapy. Able to state positive coping skills such as \"walking\". Patient was irritated at the start of the shift, by the end of the day patient is calm and cooperative with staff. Q 15 minute checks to be maintained throughout shift for safety.    " No

## 2024-08-01 NOTE — PROGRESS NOTES
" REHAB Therapy Assessment & Treatment    Patient Name: Maddie Bautista  MRN: [id number]  Today's Date: 8/1/2024      Activity Assessment:  Initial Assessment  Attention Span: 5-15 Minutes  Cognitive Behavior Status/Orientation: Person, Place  Crisis Triggers: Emotions, Mood (worsening anxiety and depression)  Emotional Concerns/Mood/Affect: Angry/irritable, Disinterested, Pessimistic, Unmovitated, Withdrawn  Hearing: Adequate  Memory: Memory intact  Motivation Level: Maximum encouragement needed  Negative Coping Skills: Substance use (marijuana)  Speech/Communication/Socialization: Verbal  Vision: Adequate    Leisure Survey:  [facility] Leisure Interest Survey  Activity Preference: Independent  Activity Tolerance: Poor 0-15 minutes  Barriers to Leisure Participation: Emotions, Mood/affect, Lack of motivation, Personality  Education/School:  grad  Leisure Interests: Unable to identify interests (pt refusing assessment at this time)  Living Arrangement: Relative (with  and daughter)  Patient Strengths: \"I'm a bitch\" - per nursing  Special Hobbies: coping skills - walks during break at work  Transportation: Drives  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 Pt reports debilitating anxiety and that she \"hates her life\", but is unable/unwilling to identify any specific triggers at this time. CTRS attempted to meet with pt. 1:1, but pt. was irritable, dismissive, and refusing assessment at this time. Pt will be encouraged to attend daily programming and provided with independent leisure opportunities during her admission.           Therapeutic Recreation:         Encounter Problems       Encounter Problems (Active)       Emotional  RT       Mood       Start:  08/01/24    Expected End:  08/08/24            Stress       Start:  08/01/24    Expected End:  08/08/24       "         Recreation  RT       Awareness       Start:  08/01/24    Expected End:  08/08/24                     Education Documentation  No documentation found.  Education Comments  No comments found.

## 2024-08-01 NOTE — CARE PLAN
"The patient's goals for the shift include \"see my  at one oclock\"    The clinical goals for the shift include maintain patient safety    Over the shift, the patient did not make progress toward the following goals. Barriers to progression include acuteness of illness. Recommendations to address these barriers include maintain Q 15 minute rounds for patient safety.         "

## 2024-08-01 NOTE — GROUP NOTE
"Group Topic: Spiritual/Devotional/Thought of the Day   Group Date: 8/1/2024  Start Time: 0730  End Time: 0810  Facilitators: YOLANDA Gamble   Department: [facility] REHAB THERAPY [facility]IRTUAL    Number of Participants: 5   Group Focus: daily focus, goals, self-awareness, and social skills  Treatment Modality: Recreational Therapy   Interventions utilized were Thought for the Day, \"Dice Breaker\", exploration, story telling, and support  Purpose: insight or knowledge and other: social engagement, personal goals    Name: Maddie Bautista YOB: 1970   MR: [id number]      Facilitator: Recreational Therapist  Level of Participation: did not attend  Progress: None  Comments: Patients were provided with the Thought of the Day reading - “All that's asked of me is attention, here, now, to others. And I'll find the good life.” Patients were given an opportunity to share their thoughts and encouraged to create a personal goal based on the reading.     Patient declined invitation to group activity at this time. Patient will continue to be provided with opportunities to enhance leisure skills and/or coping mechanisms.    Plan: continue with services      "

## 2024-08-01 NOTE — CONSULTS
History Of Present Illness  Maddie Bautista is a 54 y.o. female with a past medical history of left eye blindness (congenital), depression anxiety, Ménière's disease PTSD and questionable hypertension who was admitted to the behavioral health unit for suicidal ideations.  Patient has been under a lot of stress.  Medicine was consulted for medical management of elevated blood pressure and years disease.  Patient was seen in the behavioral health unit.  She states that she feels okay right now.  She denied headache, dizziness, chest pain, palpitation, difficulty breathing, nausea, vomiting, abdominal pain, melena, hematochezia, hematuria, leg pain or leg swelling.     Past Medical History  She has a past medical history of Abrasion of breast, unspecified breast, initial encounter, Depression, Other specified postprocedural states, Overweight, Personal history of diseases of the skin and subcutaneous tissue, Personal history of other diseases of the digestive system, Personal history of other medical treatment, Personal history of other mental and behavioral disorders, Personal history of other specified conditions, PTSD (post-traumatic stress disorder), Unspecified disorder of synovium and tendon, unspecified shoulder, and Unspecified thoracic, thoracolumbar and lumbosacral intervertebral disc disorder.    Surgical History  She has a past surgical history that includes Tonsillectomy (09/24/2014); Lumbar laminectomy (10/18/2016); Incisional breast biopsy (10/20/2014); Tubal ligation (10/20/2014); and Hysteroscopy (10/20/2014).     Social History  She reports that she has been smoking cigarettes. She started smoking about 7 months ago. She has a 0.3 pack-year smoking history. She has never used smokeless tobacco. She reports current alcohol use of about 1.0 standard drink of alcohol per week. She reports current drug use. Drug: Marijuana.    Family History  No family history on file.     Allergies  Penicillins and  Tetracycline    Medications  Scheduled medications     Continuous medications     PRN medications  PRN medications: alum-mag hydroxide-simeth, diphenhydrAMINE **OR** diphenhydrAMINE, LORazepam **OR** LORazepam, melatonin, OLANZapine **OR** OLANZapine, OLANZapine **OR** OLANZapine    Review of systems: 10-point review of systems is negative.     Physical Exam  Constitutional: alert and oriented x 3, awake, cooperative, no acute distress  Skin: warm and dry  Head/Neck: Normocephalic, atraumatic  Eyes: clear sclera, she does not have good control of the left eye  ENMT: mucous membranes moist  Cardio: Regular rate and rhythm  Resp: CTA bilaterally, good respiratory effort  Gastrointestinal: Soft, nontender, nondistended, bowel sounds present  Musculoskeletal: ROM intact, no joint swelling  Extremities: No edema, cyanosis, or clubbing  Neuro: lert and oriented x 3, sensation is intact.  Patient moves all limbs against resistance.  Psychological: Depressed     Last Recorded Vitals  /83 (Patient Position: Sitting)   Pulse 57   Temp 35.9 °C (96.6 °F) (Oral)   Resp 19   Wt 70.9 kg (156 lb 4.9 oz)     Relevant Results  Admission on 08/01/2024   Component Date Value Ref Range Status    Glucose, Fasting 08/01/2024 92  74 - 99 mg/dL Final    Cholesterol 08/01/2024 200 (H)  0 - 199 mg/dL Final          Age      Desirable   Borderline High   High     0-19 Y     0 - 169       170 - 199     >/= 200    20-24 Y     0 - 189       190 - 224     >/= 225         >24 Y     0 - 199       200 - 239     >/= 240   **All ranges are based on fasting samples. Specific   therapeutic targets will vary based on patient-specific   cardiac risk.    Pediatric guidelines reference:Pediatrics 2011, 128(S5).Adult guidelines reference: NCEP ATPIII Guidelines,STEVENSON 2001, 258:2486-97    Venipuncture immediately after or during the administration of Metamizole may lead to falsely low results. Testing should be performed immediately prior to  Metamizole dosing.    HDL-Cholesterol 08/01/2024 42.1  mg/dL Final      Age       Very Low   Low     Normal    High    0-19 Y    < 35      < 40     40-45     ----  20-24 Y    ----     < 40      >45      ----        >24 Y      ----     < 40     40-60      >60      Cholesterol/HDL Ratio 08/01/2024 4.8   Final      Ref Values  Desirable  < 3.4  High Risk  > 5.0    LDL Calculated 08/01/2024 137 (H)  <=99 mg/dL Final                                Near   Borderline      AGE      Desirable  Optimal    High     High     Very High     0-19 Y     0 - 109     ---    110-129   >/= 130     ----    20-24 Y     0 - 119     ---    120-159   >/= 160     ----      >24 Y     0 -  99   100-129  130-159   160-189     >/=190      VLDL 08/01/2024 21  0 - 40 mg/dL Final    Triglycerides 08/01/2024 105  0 - 149 mg/dL Final       Age         Desirable   Borderline High   High     Very High   0 D-90 D    19 - 174         ----         ----        ----  91 D- 9 Y     0 -  74        75 -  99     >/= 100      ----    10-19 Y     0 -  89        90 - 129     >/= 130      ----    20-24 Y     0 - 114       115 - 149     >/= 150      ----         >24 Y     0 - 149       150 - 199    200- 499    >/= 500    Venipuncture immediately after or during the administration of Metamizole may lead to falsely low results. Testing should be performed immediately prior to Metamizole dosing.    Non HDL Cholesterol 08/01/2024 158 (H)  0 - 149 mg/dL Final          Age       Desirable   Borderline High   High     Very High     0-19 Y     0 - 119       120 - 144     >/= 145    >/= 160    20-24 Y     0 - 149       150 - 189     >/= 190      ----         >24 Y    30 mg/dL above LDL Cholesterol goal     Admission on 07/31/2024, Discharged on 08/01/2024   Component Date Value Ref Range Status    WBC 07/31/2024 6.8  4.4 - 11.3 x10*3/uL Final    nRBC 07/31/2024 0.0  0.0 - 0.0 /100 WBCs Final    RBC 07/31/2024 4.65  4.00 - 5.20 x10*6/uL Final    Hemoglobin 07/31/2024 15.1   12.0 - 16.0 g/dL Final    Hematocrit 07/31/2024 43.8  36.0 - 46.0 % Final    MCV 07/31/2024 94  80 - 100 fL Final    MCH 07/31/2024 32.5  26.0 - 34.0 pg Final    MCHC 07/31/2024 34.5  32.0 - 36.0 g/dL Final    RDW 07/31/2024 12.7  11.5 - 14.5 % Final    Platelets 07/31/2024 241  150 - 450 x10*3/uL Final    Neutrophils % 07/31/2024 41.7  40.0 - 80.0 % Final    Immature Granulocytes %, Automated 07/31/2024 0.0  0.0 - 0.9 % Final    Immature Granulocyte Count (IG) includes promyelocytes, myelocytes and metamyelocytes but does not include bands. Percent differential counts (%) should be interpreted in the context of the absolute cell counts (cells/UL).    Lymphocytes % 07/31/2024 49.1  13.0 - 44.0 % Final    Monocytes % 07/31/2024 5.5  2.0 - 10.0 % Final    Eosinophils % 07/31/2024 2.8  0.0 - 6.0 % Final    Basophils % 07/31/2024 0.9  0.0 - 2.0 % Final    Neutrophils Absolute 07/31/2024 2.83  1.20 - 7.70 x10*3/uL Final    Percent differential counts (%) should be interpreted in the context of the absolute cell counts (cells/uL).    Immature Granulocytes Absolute, Au* 07/31/2024 0.00  0.00 - 0.70 x10*3/uL Final    Lymphocytes Absolute 07/31/2024 3.33  1.20 - 4.80 x10*3/uL Final    Monocytes Absolute 07/31/2024 0.37  0.10 - 1.00 x10*3/uL Final    Eosinophils Absolute 07/31/2024 0.19  0.00 - 0.70 x10*3/uL Final    Basophils Absolute 07/31/2024 0.06  0.00 - 0.10 x10*3/uL Final    Glucose 07/31/2024 126 (H)  74 - 99 mg/dL Final    Sodium 07/31/2024 139  136 - 145 mmol/L Final    Potassium 07/31/2024 3.6  3.5 - 5.3 mmol/L Final    Chloride 07/31/2024 104  98 - 107 mmol/L Final    Bicarbonate 07/31/2024 24  21 - 32 mmol/L Final    Anion Gap 07/31/2024 15  10 - 20 mmol/L Final    Urea Nitrogen 07/31/2024 6  6 - 23 mg/dL Final    Creatinine 07/31/2024 0.90  0.50 - 1.05 mg/dL Final    eGFR 07/31/2024 77  >60 mL/min/1.73m*2 Final    Calculations of estimated GFR are performed using the 2021 CKD-EPI Study Refit equation without the  race variable for the IDMS-Traceable creatinine methods.  https://jasn.asnjournals.org/content/early/2021/09/22/ASN.4106260684    Calcium 07/31/2024 10.0  8.6 - 10.3 mg/dL Final    Albumin 07/31/2024 4.7  3.4 - 5.0 g/dL Final    Alkaline Phosphatase 07/31/2024 91  33 - 110 U/L Final    Total Protein 07/31/2024 7.4  6.4 - 8.2 g/dL Final    AST 07/31/2024 15  9 - 39 U/L Final    Bilirubin, Total 07/31/2024 0.6  0.0 - 1.2 mg/dL Final    ALT 07/31/2024 11  7 - 45 U/L Final    Patients treated with Sulfasalazine may generate falsely decreased results for ALT.    Amphetamine Screen, Urine 07/31/2024 Presumptive Negative  Presumptive Negative Final    CUTOFF LEVEL: 500 NG/ML   Cross-reactivity has been reported with high concentrations   of the following drugs: buproprion, chloroquine, chlorpromazine,   ephedrine, mephentermine, fenfluramine, phentermine,   phenylpropanolamine, pseudoephedrine, and propranolol.    Barbiturate Screen, Urine 07/31/2024 Presumptive Negative  Presumptive Negative Final    CUTOFF LEVEL: 200 NG/ML    Benzodiazepines Screen, Urine 07/31/2024 Presumptive Negative  Presumptive Negative Final    CUTOFF LEVEL: 200 NG/ML    Cannabinoid Screen, Urine 07/31/2024 Presumptive Positive (A)  Presumptive Negative Final    CUTOFF LEVEL: 50 NG/ML    Cocaine Metabolite Screen, Urine 07/31/2024 Presumptive Negative  Presumptive Negative Final    CUTOFF LEVEL: 150 NG/ML    Fentanyl Screen, Urine 07/31/2024 Presumptive Negative  Presumptive Negative Final    CUTOFF LEVEL: 5 NG/ML    Opiate Screen, Urine 07/31/2024 Presumptive Negative  Presumptive Negative Final    CUTOFF LEVEL: 300 NG/ML  The opiate screen does not detect fentanyl, meperidine, or   tramadol. Oxycodone is not consistently detected (refer to  Oxycodone Screen, Urine result).    Oxycodone Screen, Urine 07/31/2024 Presumptive Negative  Presumptive Negative Final    CUTOFF LEVEL: 100 NG/ML  This test will accurately detect both oxycodone and  oxymorphone.    PCP Screen, Urine 07/31/2024 Presumptive Negative  Presumptive Negative Final    CUTOFF LEVEL:  25 NG/ML  Cross-reactivity has been reported with dextromethorphan.    Methadone Screen, Urine 07/31/2024 Presumptive Negative  Presumptive Negative Final    CUTOFF LEVEL: 150 NG/ML  The metabolite L-alpha-acetylmethadol (LAAM) is not  detected by this method in concentrations that would  be found in the urine of patients on LAAM therapy.    Acetaminophen 07/31/2024 <10.0  10.0 - 30.0 ug/mL Final    Salicylate  07/31/2024 <3  4 - 20 mg/dL Final    Alcohol 07/31/2024 <10  <=10 mg/dL Final    Ventricular Rate 07/31/2024 70  BPM Preliminary    Atrial Rate 07/31/2024 70  BPM Preliminary    MO Interval 07/31/2024 132  ms Preliminary    QRS Duration 07/31/2024 78  ms Preliminary    QT Interval 07/31/2024 412  ms Preliminary    QTC Calculation(Bazett) 07/31/2024 444  ms Preliminary    P Axis 07/31/2024 150  degrees Preliminary    R Axis 07/31/2024 -20  degrees Preliminary    T Axis 07/31/2024 138  degrees Preliminary    QRS Count 07/31/2024 12  beats Preliminary    Q Onset 07/31/2024 224  ms Preliminary    P Onset 07/31/2024 158  ms Preliminary    P Offset 07/31/2024 205  ms Preliminary    T Offset 07/31/2024 430  ms Preliminary    QTC Fredericia 07/31/2024 433  ms Preliminary    HCG, Urine 07/31/2024 NEGATIVE  NEGATIVE Final      Electrocardiogram, 12-lead    Result Date: 8/1/2024  Unusual P axis, possible ectopic atrial rhythm Low voltage QRS Inferior infarct , age undetermined Cannot rule out Anterior infarct , age undetermined Abnormal ECG No previous ECGs available       Assessment/Plan   Principal Problem:    Suicidal ideation  Elevated blood pressure    Maddie Bautista is a 54 y.o. female with a past medical history of left eye blindness (congenital), depression anxiety, Ménière's disease PTSD and questionable hypertension who was admitted to the behavioral health unit for suicidal ideations.  Patient  states that she only takes two medication at home 1 is triamterene/HCTZ and the other 1 is citalopram.     Elevated blood pressure  -Restart triamterene/HCTZ.  -Will monitor    Ménière's disease  -Continue supportive care  -Continue triamterene/hydrochlorothiazide    Depression with suicidal [id number] with psychiatry  -Continue supportive care    DVT prophylaxis  -Ambulation           Jhon Alvarado MD

## 2024-08-01 NOTE — GROUP NOTE
"Group Topic: Gross Motor/Balance Skills   Group Date: 8/1/2024  Start Time: 1400  End Time: 1500  Facilitators: AYAH GambleS   Department: [facility]    Number of Participants: 4   Group Focus: leisure skills and social skills  Treatment Modality: Recreational Therapy   Interventions utilized were Bulzi Bucket, exploration and leisure development  Purpose: other: leisure awareness, social engagement, healthy competition     Name: Maddie Bautista YOB: 1970   MR: [id number]      Facilitator: Recreational Therapist  Level of Participation: did not attend  Progress: None  Comments: Patients were gathered and encouraged to actively participate in the game \"[employer]\". This activity works on motor skills/coordinating movements, healthy competition, social interaction, and provides an overall pleasurable experience.     Patient participating in a family meeting and unable to attend group activity at this time. Patient will continue to be provided with opportunities to enhance leisure skills and/or coping mechanisms.    Plan: continue with services      "

## 2024-08-01 NOTE — ED NOTES
Accepted to [address]  Accepting is Dr. Vimal Reynoso slip to [facility]  Need current VS and RN to RN before transfer    [phone]     Justin Manuel RN  07/31/24 2156       Justin Manuel RN  07/31/24 2157

## 2024-08-01 NOTE — CARE PLAN
"The patient's goals for the shift include \"to have a peaceful night\"    The clinical goals for the shift include medication compliance    Over the shift, the patient did not make progress toward the following goals. Barriers to progression include lack of medication knowledge. Recommendations to address these barriers include more education on medications.    "

## 2024-08-01 NOTE — CARE PLAN
"The patient's goals for the shift include \"to have a peaceful night\"    The clinical goals for the shift include medication compliance    Over the shift, the patient did not make progress toward the following goals. Barriers to progression include lack of medication knowledge. Recommendations to address these barriers include more education on medications.    " EKG @ 6545
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
## 2024-08-01 NOTE — GROUP NOTE
Group Topic: Dialectical Behavioral Therapy - Mindfulness   Group Date: 8/1/2024  Start Time: 1600  End Time: 1640  Facilitators: YOLANDA Gamble   Department: [facility] REHAB THERAPY VIRTUAL    Number of Participants: 7   Group Focus: coping skills, mindfulness, and relaxation  Treatment Modality: Recreational Therapy   Interventions utilized were Mindfulness, Guided Meditation, exploration, patient education, and support  Purpose: coping skills, insight or knowledge, and other: relaxation techniques    Name: Maddie Bautista YOB: 1970   MR: [id number]      Facilitator: Recreational Therapist  Level of Participation: moderate  Quality of Participation: cooperative, passive, and quiet  Interactions with others:  none  Mood/Affect: appropriate and flat  Triggers (if applicable): n/a  Cognition: coherent/clear  Progress: Moderate  Comments: In today's DBT group, we explored the definition and benefits of mindfulness, fostering a space for participants to enhance their awareness and well-being. The session included discussions on practical examples of mindfulness exercises, empowering individuals to incorporate these practices into their daily lives for improved mental and emotional resilience. We also listened/watched a 15 minute guided mediation and had discussion afterwards.     Pt was calm, cooperative, and quiet throughout. She accepted handout and appeared to be listening, but remained passive during conversation and guided meditation. Stayed for full session.     Plan: continue with services
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
## 2024-08-01 NOTE — PROGRESS NOTES
Occupational Therapy                 Therapy Communication Note    Patient Name: Maddie Bautista  MRN: [id number]  Today's Date: 8/1/2024     Discipline: Occupational Therapy    Missed Visit Reason: Missed Visit Reason: Patient refused (at 09:36, pt adamantly refused participation in evaluation and  activities. With encouragement, pt becomes more agitated and argumentative. Nursing staff notified of pt's elevated mood and refusal. No OT eval)    Missed Time: Attempt
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
## 2024-08-01 NOTE — SIGNIFICANT EVENT
Application for Emergency Admission      Ready for Transfer?  Is the patient medically cleared for transfer to inpatient psychiatry: Yes  Has the patient been accepted to an inpatient psychiatric hospital: Yes    Application for Emergency Admission  IN ACCORDANCE [place]  The Chief Clinical Officer of: [facility] 7/31/2024 .10:59 PM    Reason for Hospitalization  The undersigned has reason to believe that: Maddie Bautista Is a mentally ill person subject to hospitalization by court order under division B Section 5122.01 of the Revised Code, i.e., this person:    1.Yes  Represents a substantial risk of physical harm to self as manifested by evidence of threats of, or attempts at, suicide or serious self-inflicted bodily harm    2.No Represents a substantial risk of physical harm to others as manifested by evidence of recent homicidal or other violent behavior, evidence of recent threats that place another in reasonable fear of violent behavior and serious physical harm, or other evidence of present dangerousness    3.No Represents a substantial and immediate risk of serious physical impairment or injury to self as manifested by  evidence that the person is unable to provide for and is not providing for the person's basic physical needs because of the person's mental illness and that appropriate provision for those needs cannot be made  immediately available in the community    4.No Would benefit from treatment in a hospital for his mental illness and is in need of such treatment as manifested by evidence of behavior that creates a grave and imminent risk to substantial rights of others or  himself.    5.No Would benefit from treatment as manifested by evidence of behavior that indicates all of the following:       (a) The person is unlikely to survive safely in the community without supervision, based on a clinical determination.       (b) The person has a history of lack of compliance with  treatment for mental illness and one of the following applies:      (i) At least twice within the thirty-six months prior to the filing of an affidavit seeking court-ordered treatment of the person under section 5122.111 of the Revised Code, the lack of compliance has been a significant factor in necessitating hospitalization in a hospital or receipt of services in a forensic or other mental health unit of a correctional facility, provided that the thirty-six-month period shall be extended by the length of any hospitalization or incarceration of the person that occurred within the thirty-six-month period.      (ii) Within the forty-eight months prior to the filing of an affidavit seeking court-ordered treatment of the person under section 5122.111 of the Revised Code, the lack of compliance resulted in one or more acts of serious violent behavior toward self or others or threats of, or attempts at, serious physical harm to self or others, provided that the forty-eight-month period shall be extended by the length of any hospitalization or incarceration of the person that occurred within the forty-eight-month period.      (c) The person, as a result of mental illness, is unlikely to voluntarily participate in necessary treatment.       (d) In view of the person's treatment history and current behavior, the person is in need of treatment in order to prevent a relapse or deterioration that would be likely to result in substantial risk of serious harm to the person or others.    (e) Represents a substantial risk of physical harm to self or others if allowed to remain at liberty pending examination.    Therefore, it is requested that said person be admitted to the above named facility.    STATEMENT OF BELIEF    Must be filled out by one of the following: a psychiatrist, licensed physician, licensed clinical psychologist, health or ,  or .  (Statement shall include the circumstances under  which the individual was taken into custody and the reason for the person's belief that hospitalization is necessary. The statement shall also include a reference to efforts made to secure the individual's property at his residence if he was taken into custody there. Every reasonable and appropriate effort should be made to take this person into custody in the least conspicuous manner possible.)    Depression with worsening suicidal risk profile     Haroon Baron MD 7/31/2024     _____________________________________________________________   Place of Employment: [facility]    STATEMENT OF OBSERVATION BY PSYCHIATRIST, LICENSED PHYSICIAN, OR LICENSED CLINICAL PSYCHOLOGIST, IF APPLICABLE    Place of Observation (e.g., [facility] center, general hospital, office, emergency facility)  (If applicable, please complete)    Haroon Baron MD 7/31/2024    _____________________________________________________________

## 2024-08-02 PROCEDURE — 99232 SBSQ HOSP IP/OBS MODERATE 35: CPT | Performed by: PSYCHIATRY & NEUROLOGY

## 2024-08-02 PROCEDURE — 1240000001 HC SEMI-PRIVATE BH ROOM DAILY

## 2024-08-02 PROCEDURE — 97150 GROUP THERAPEUTIC PROCEDURES: CPT | Mod: GO,CO

## 2024-08-02 PROCEDURE — 2500000002 HC RX 250 W HCPCS SELF ADMINISTERED DRUGS (ALT 637 FOR MEDICARE OP, ALT 636 FOR OP/ED): Performed by: INTERNAL MEDICINE

## 2024-08-02 PROCEDURE — S4991 NICOTINE PATCH NONLEGEND: HCPCS | Performed by: INTERNAL MEDICINE

## 2024-08-02 PROCEDURE — 2500000001 HC RX 250 WO HCPCS SELF ADMINISTERED DRUGS (ALT 637 FOR MEDICARE OP): Performed by: PSYCHIATRY & NEUROLOGY

## 2024-08-02 PROCEDURE — 97165 OT EVAL LOW COMPLEX 30 MIN: CPT | Mod: GO

## 2024-08-02 RX ORDER — CITALOPRAM 20 MG/1
20 TABLET, FILM COATED ORAL DAILY
Status: DISCONTINUED | OUTPATIENT
Start: 2024-08-03 | End: 2024-08-05 | Stop reason: HOSPADM

## 2024-08-02 RX ORDER — CITALOPRAM 20 MG/1
20 TABLET, FILM COATED ORAL ONCE
Status: COMPLETED | OUTPATIENT
Start: 2024-08-02 | End: 2024-08-02

## 2024-08-02 ASSESSMENT — PAIN - FUNCTIONAL ASSESSMENT
PAIN_FUNCTIONAL_ASSESSMENT: 0-10
PAIN_FUNCTIONAL_ASSESSMENT: 0-10

## 2024-08-02 ASSESSMENT — COLUMBIA-SUICIDE SEVERITY RATING SCALE - C-SSRS
2. HAVE YOU ACTUALLY HAD ANY THOUGHTS OF KILLING YOURSELF?: NO
1. SINCE LAST CONTACT, HAVE YOU WISHED YOU WERE DEAD OR WISHED YOU COULD GO TO SLEEP AND NOT WAKE UP?: NO
6. HAVE YOU EVER DONE ANYTHING, STARTED TO DO ANYTHING, OR PREPARED TO DO ANYTHING TO END YOUR LIFE?: NO

## 2024-08-02 ASSESSMENT — PAIN SCALES - GENERAL
PAINLEVEL_OUTOF10: 0 - NO PAIN
PAINLEVEL_OUTOF10: 0 - NO PAIN

## 2024-08-02 NOTE — NURSING NOTE
"Pt interview in the patients room  Pt is sitting on her bed getting ready to come out to eat her snack   Pt rated her anxiety 0/10  depression 5/10  and denied everything else   Pt stated her goal \"get sleep\"  her strength \"I take care of people\" and ' I meditate\"  Pt is appropriate with her answers to the questions at this time   "

## 2024-08-02 NOTE — NURSING NOTE
"Patient out on the unit and social with peers this shift. Rated anxiety 0/10 and depression 3/10. Denied SI/HI. Denied auditory/visual/other hallucinations. No complaints of pain or discomfort. Patient has attended group therapy this shift. Medication compliant. No PRN's given. Able to state positive coping skills such as \"meditation\". Patient has been pleasant and cooperative with staff. Q 15 minute checks to be maintained throughout shift for safety.    "
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
## 2024-08-02 NOTE — PROGRESS NOTES
"Maddie Bautista is a 54 y.o. female on day 1 of admission presenting with Suicidal ideation.      Subjective   The patient was seen and examined. I reviewed the chart and vital signs from overnight. I reviewed previous notes. I reviewed medications, administered overnight and their reported benefits or side effects.  Patient reported, trauma history, loss, trust issues, not feeling worthy. She visited with daughter and . Said she felt better today, did not want to end her life or give up. Stated she wanted to get better and fix \"these issues\". Difficult, but started to open up in group. Slept better than she thought. No drug side effects.          Objective     Last Recorded Vitals  Blood pressure 125/87, pulse 88, temperature 36.5 °C (97.7 °F), resp. rate 18, height 1.611 m (5' 3.43\"), weight 70.9 kg (156 lb 4.9 oz), SpO2 97%.    Review of Systems    Psychiatric ROS - Adult  Anxiety: Negative  Depression: anhedonia  Delirium: negative  Psychosis: negative  Ashley: negative  Safety Issues: none    MSE:                                                                                                             General:   Appearance: Appears stated age, dressed in hospital attire, appropriate grooming and hygiene  Attitude:  Calmer, cooperative  Behavior:  Appropriate eye contact, improved attitude to admission noted  Movement: No psychomotor agitation or retardation. No EPS/TD. Normal gait and station. Normal muscle tone/bulk.  Speech and language: Regular rate, rhythm, volume and tone, spontaneous, fluent.   Mood: crying, grief, loss, depression, feels depression 3 today  Affect: calmer  Thought process:  linear, organized, logical Linear, goal directed  Thought content:  Does not endorse suicidal ideation or homicidal ideations, no delusions elicited.  Perception: Does not endorse auditory/visual hallucinations. Does not appear to be responding to hallucinatory stimuli. No perceptual abnormalities " noted  Cognition:  alert and oriented x 4, short and long term memory grossly intact,  attention and concentration grossly intact   Insight:  Fair, as patient recognizes symptoms of illness and need for recommended treatments.   Judgment: Can make reasonable decisions about ordinary activities of daily living and necessary medical care recommendations    IMPRESSION/PLAN OF CARE:  Major Depressive Disorder, severe, without psychosis  Suicidal ideation  Former smoker  Cannabis dependence  Grief, complicated    Medication recommendations:   Has not been on celexa for week  Stop celexa  Start wellbutrin ir 75mg now and 100mg sr bid in am  Start remeron 15mg at bedtime  Start xanax 0.5mg bid prn    8/2: reported mild intermittent flushed feeling. Restart celexa at 20mg with more gradual taper. Continue remeron 15mg and wellbutrin sr 100mg bid       I spent 26 minutes in the professional and overall care of this patient.      Katya Celis MD

## 2024-08-02 NOTE — NURSING NOTE
Pt slept all night long  [facility] then [facility]  Pt had an uneventful night

## 2024-08-02 NOTE — CARE PLAN
"The patient's goals for the shift include \"continue to eat\"    The clinical goals for the shift include medication compliance    Over the shift, the patient did not make progress toward the following goals. Barriers to progression include acuteness of illness. Recommendations to address these barriers include educate patient on prescribed medications and the importance of continuing to take after discharge.    "

## 2024-08-02 NOTE — GROUP NOTE
"Group Topic: Self Esteem   Group Date: 8/2/2024  Start Time: 1600  End Time: 1710  Facilitators: YOLANDA Gamble   Department: [facility] REHAB THERAPY VIRTUAL    Number of Participants: 9   Group Focus: personal responsibility, self-awareness, self-esteem, and social skills  Treatment Modality: Recreational Therapy   Interventions utilized were Strengths Exploration, exploration, patient education, story telling, and support  Purpose: insight or knowledge, self-worth, and self-care    Name: Maddie Bautista YOB: 1970   MR: [id number]      Facilitator: Recreational Therapist  Level of Participation: active  Quality of Participation: appropriate/pleasant, cooperative, and engaged  Interactions with others: appropriate, gave feedback, supportive, and offered helpful suggestions  Mood/Affect: appropriate, brightens with interaction, and positive  Triggers (if applicable): n/a  Cognition: coherent/clear and goal directed  Progress: Moderate  Comments: Patients were provided with the “Strengths Exploration” handout. We discussed the idea of identifying personal strengths and how we can use them to feel happier, improve our self-esteem and accomplish goals. Patients were given an opportunity to review a list of strengths and encouraged to Teller their top strengths. We discussed how we can use our strengths to help us in our various relationships also areas where we can improve.    Pt was fully engaged in group discussion, able to identify several personal strengths and shared aloud. Pt open about her struggles with fully sharing her emotions at home and \"putting others before myself\". Pt recognizes the importance of self-care and time for herself to avoid burn out - pt joked \"or you end up here!\"    Plan: continue with services      "

## 2024-08-02 NOTE — GROUP NOTE
"Group Topic: Art Creative   Group Date: 8/2/2024  Start Time: 1400  End Time: 1500  Facilitators: YOLANDA Gamble   Department: [facility] REHAB THERAPY VIRTUAL    Number of Participants: 8   Group Focus: concentration, leisure skills, and social skills  Treatment Modality: Recreational Therapy   Interventions utilized were Sand Art, exploration and leisure development  Purpose: other: creative expression, leisure awareness, social engagement     Name: Maddie Bautista YOB: 1970   MR: [id number]      Facilitator: Recreational Therapist  Level of Participation: active  Quality of Participation: appropriate/pleasant, cooperative, and engaged  Interactions with others: appropriate and supportive  Mood/Affect: appropriate, brightens with interaction, and positive  Triggers (if applicable): n/a  Cognition: coherent/clear and goal directed  Progress: Significant  Comments: Patients were gathered to participate in creating/designing \"Sand Art\" pictures. This activity works on creative expression, fine motor skills, following directions, positive social interaction, and leisure awareness.     Pt was pleasant, social with peers (offering support/compliments) and fully engaged, completing multiple projects independently. Pt continues to show a dramatic improvement in mood and overall group participation.     Plan: continue with services      "
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
## 2024-08-02 NOTE — CARE PLAN
Stabilizing; possible discharge on Monday 08/05; sw called multiple outpatient mental health private practices and left voice mails for: [facility], [facility], [facility], and [facility]; Spent 1/2 hour on phone with [facility]: scheduled follow up therapy; will call back to schedule psychiatry asap, probably on Monday;  Sw to follow.

## 2024-08-03 PROCEDURE — 2500000001 HC RX 250 WO HCPCS SELF ADMINISTERED DRUGS (ALT 637 FOR MEDICARE OP): Performed by: PSYCHIATRY & NEUROLOGY

## 2024-08-03 PROCEDURE — 99232 SBSQ HOSP IP/OBS MODERATE 35: CPT | Performed by: PSYCHIATRY & NEUROLOGY

## 2024-08-03 PROCEDURE — 2500000002 HC RX 250 W HCPCS SELF ADMINISTERED DRUGS (ALT 637 FOR MEDICARE OP, ALT 636 FOR OP/ED): Performed by: INTERNAL MEDICINE

## 2024-08-03 PROCEDURE — 1240000001 HC SEMI-PRIVATE BH ROOM DAILY

## 2024-08-03 PROCEDURE — S4991 NICOTINE PATCH NONLEGEND: HCPCS | Performed by: INTERNAL MEDICINE

## 2024-08-03 ASSESSMENT — PAIN SCALES - GENERAL
PAINLEVEL_OUTOF10: 0 - NO PAIN
PAINLEVEL_OUTOF10: 0 - NO PAIN

## 2024-08-03 ASSESSMENT — PAIN - FUNCTIONAL ASSESSMENT
PAIN_FUNCTIONAL_ASSESSMENT: 0-10
PAIN_FUNCTIONAL_ASSESSMENT: 0-10

## 2024-08-03 NOTE — GROUP NOTE
Group Topic: Leisure Skills   Group Date: 8/3/2024  Start Time: 1100  End Time: 1200  Facilitators: YOLANDA Harris   Department: [facility] REHAB THERAPY VIRTUAL    Number of Participants: 9   Group Focus: leisure skills, physical activity  Treatment Modality: Recreation Therapy  Interventions utilized were: Corkaine, leisure development  Purpose: Leisure Awareness, Social Stimulation, Physical/Movement, Pleasurable activity    Name: Maddie Bautista YOB: 1970   MR: [id number]      Facilitator: Recreational Therapist  Level of Participation: active  Quality of Participation: appropriate/pleasant, cooperative, and engaged  Interactions with others: appropriate  Mood/Affect: appropriate and positive  Triggers (if applicable): N/A  Cognition: coherent/clear and capable  Progress: Moderate  Comments: This session involved a leisure activity called “Corkaine”.  The activity involved physical tasks, social interactions, healthy competition, leisure awareness, and a pleasurable experience. All participants were encouraged to listen to the rules, ask questions as needed, and participate in the activity to the best of their abilities.     Ms. Bautista attended most of the session. She was removed briefly to meet with the physician. Patient was social and able to complete all physical tasks. She appeared to enjoy the activity and the company of her peers.     Plan: continue with services

## 2024-08-03 NOTE — GROUP NOTE
Group Topic: Other   Group Date: 8/3/2024  Start Time: 0930  End Time: 1025  Facilitators: YOLANDA Harris   Department: [facility] REHAB THERAPY VIRTUAL    Number of Participants: 11   Group Focus: Attitude, check in, community group, daily focus, goals, and self-awareness  Treatment Modality: Recreation Therapy  Interventions utilized were: 10 Attitude Improvement Tips, Community Meeting, exploration, orientation, and support  Purpose: coping skills, maladaptive thinking, feelings, and insight or knowledge    Name: Maddie Bautista YOB: 1970   MR: [id number]      Facilitator: Recreational Therapist  Level of Participation: active  Quality of Participation: attentive, cooperative, and engaged  Interactions with others: appropriate  Mood/Affect: appropriate  Triggers (if applicable): N/A  Cognition:  capable  Progress: Moderate  Comments: This session provided strategies to improve one's attitude. Participants were provided skills including breathing techniques, importance of gratitude, managing challenges, managing rejection, using healthier words, helping others, changing “have” to “get”, changing complaints, problems with solutions, and identifying the honorable. This session also included providing participants an opportunity to understand unit guidelines, rules, expectations, and provide a healthy environment to give suggestions for unit improvements. Community meeting questionnaire slips were passed out and collect.     Patient attended the entire session and completed all desired group tasks with encouragement. She shared a coping strategy during the icebreaker activity and engaged well throughout.       Plan: continue with services

## 2024-08-03 NOTE — CARE PLAN
Patient is pleasant and cooperative. She is social with peers. She denies anxiety, depression, SI/HI, and AH/VH. She has attended groups. She is med compliant. She had shower today. Dr. Celis made aware of Vitamin D results. Communication order in for patient to have own comb. No PRN medications given. 15 minute safety checks maintained.

## 2024-08-03 NOTE — GROUP NOTE
"Group Topic: Chemical Dependency - Dual Diagnosis   Group Date: 8/3/2024  Start Time: 1400  End Time: 1515  Facilitators: YOLANDA Harris   Department: [facility] REHAB THERAPY VIRTUAL    Number of Participants: 7   Group Focus: chemical dependency education, dual diagnosis, and psychiatric education  Treatment Modality: Recreation Therapy  Interventions utilized were: Name that Stage (Stages of Recovery), clarification, exploration, mental fitness, patient education, and support  Purpose: Dual Diagnosis education, insight or knowledge and self-care    Name: Maddie Bautista YOB: 1970   MR: [id number]      Facilitator: Recreational Therapist  Level of Participation: active  Quality of Participation: attentive, cooperative, engaged, and initiates communication  Interactions with others:  forthcoming and appropriate  Mood/Affect: appropriate  Triggers (if applicable): N/A  Cognition: coherent/clear and capable  Progress: Moderate  Comments: This session involved education on the stages of recovery related to mental health and addiction.  Patients were asked to problem solve examples into the appropriate stage (5 stages: before bottom, bottom, on the fence, commitment, and life goes on).  Patients were also provided a resource of \"other areas of recovery\" that may be of assistance during a recovery process.    Patient was participative and willing to complete all group tasks. She was able to understand the stages of recovery and the purpose of the session. She mentioned the way she was admitted and how it was a \"bottom\" moment. Patient discussed telling others about \"suicidal ideations and asking for help\" and feeling like this is also was a \"rock bottom\". We discussed that in recovery sometimes we will have troubling days, and when we reach out immediately it is a level of commitment.     Plan: continue with services      "

## 2024-08-03 NOTE — NURSING NOTE
"Patient is out on the unit social with other female patients. Pt is laughing and talking loudly with others in the lounge during snack. Pt smiles and makes appropriate eye contact when spoken to. Pt stated \"none\"  for anxiety, rated depression 1, denied si/hi,denied a/v hallucinations. Pt is medication compliant.  "

## 2024-08-03 NOTE — CARE PLAN
"The patient's goals for the shift include \"to sleep\"    The clinical goals for the shift include medication compliance    Over the shift, the patient did make progress toward the following goals    Problem: Safety - Adult  Goal: Free from fall injury  Outcome: Progressing     Problem: IP Suicidal Ideation  Goal: STG: Maddie Bautista will verbalize understanding of suicide precautions  Outcome: Progressing  Goal: LTG: Maddie will exhibit compliance with therapy  Outcome: Progressing  Goal: STG: Maddie [place]ho[place] will not engage in self-injurious activities  Outcome: Progressing  Goal: STG: Maddie Bautista will decrease modifiable risk factors  Outcome: Progressing  Goal: STG: Maddie will identify any  positive aspects of oneself  Outcome: Progressing     "

## 2024-08-03 NOTE — PROGRESS NOTES
"Maddie Bautista is a 54 y.o. female on day 2 of admission presenting with Suicidal ideation.      Subjective   The patient was seen and examined. I reviewed the chart and vital signs from overnight. I reviewed previous notes. I reviewed medications, administered overnight and their reported benefits or side effects.  Patient reported, she slept well again last night. Feels hopeful, her life is not over. Very upbeat, talking to her family, attending all groups. Stated \"I am not suicidal, I really am not, I feel hopeful after being her that I can turn things around, I dont have a large family or support group, but I am close with my  and daughter\". Pleasant, smiling, engaged in assessment.       Objective     Last Recorded Vitals  Blood pressure 128/86, pulse 68, temperature 36.3 °C (97.3 °F), temperature source Temporal, resp. rate 18, height 1.611 m (5' 3.43\"), weight 70.9 kg (156 lb 4.9 oz), SpO2 97%.    Review of Systems    Psychiatric ROS - Adult  Anxiety: Negative  Depression: anhedonia  Delirium: negative  Psychosis: negative  Ashley: negative  Safety Issues: none    MSE:                                                                                                             General:   Appearance: Appears stated age, dressed in hospital attire, appropriate grooming and hygiene  Attitude:  Calmer, cooperative  Behavior:  Appropriate eye contact, improved attitude to admission noted  Movement: No psychomotor agitation or retardation. No EPS/TD. Normal gait and station. Normal muscle tone/bulk.  Speech and language: Regular rate, rhythm, volume and tone, spontaneous, fluent.   Mood: crying, grief, loss, depression, feels depression 3 today  Affect: bright, euthymic  Thought process:  linear, organized, logical Linear, goal directed  Thought content:  Does not endorse suicidal ideation or homicidal ideations, no delusions elicited.  Perception: Does not endorse auditory/visual hallucinations. Does not " appear to be responding to hallucinatory stimuli. No perceptual abnormalities noted  Cognition:  alert and oriented x 4, short and long term memory grossly intact,  attention and concentration grossly intact   Insight:  Fair, as patient recognizes symptoms of illness and need for recommended treatments.   Judgment: Can make reasonable decisions about ordinary activities of daily living and necessary medical care recommendations    IMPRESSION/PLAN OF CARE:  Major Depressive Disorder, severe, without psychosis  Suicidal ideation  Former smoker  Cannabis dependence  Grief, complicated    Medication recommendations:   Has not been on celexa for week  Stop celexa  Start wellbutrin ir 75mg now and 100mg sr bid in am  Start remeron 15mg at bedtime  Start xanax 0.5mg bid prn    8/2: reported mild intermittent flushed feeling. Restart celexa at 20mg with more gradual taper. Continue remeron 15mg and wellbutrin sr 100mg bid   8/3: continue to monitor, monitor mood, thought content, group adherence    I spent 26 minutes in the professional and overall care of this patient.      Katya Celis MD

## 2024-08-04 VITALS
HEIGHT: 63 IN | BODY MASS INDEX: 28.71 KG/M2 | OXYGEN SATURATION: 99 % | SYSTOLIC BLOOD PRESSURE: 176 MMHG | RESPIRATION RATE: 18 BRPM | HEART RATE: 80 BPM | WEIGHT: 162.04 LBS | TEMPERATURE: 96.4 F | DIASTOLIC BLOOD PRESSURE: 87 MMHG

## 2024-08-04 PROBLEM — R45.851 SUICIDAL IDEATION: Status: RESOLVED | Noted: 2024-07-31 | Resolved: 2024-08-04

## 2024-08-04 PROCEDURE — 2500000002 HC RX 250 W HCPCS SELF ADMINISTERED DRUGS (ALT 637 FOR MEDICARE OP, ALT 636 FOR OP/ED): Performed by: INTERNAL MEDICINE

## 2024-08-04 PROCEDURE — S4991 NICOTINE PATCH NONLEGEND: HCPCS | Performed by: INTERNAL MEDICINE

## 2024-08-04 PROCEDURE — 99232 SBSQ HOSP IP/OBS MODERATE 35: CPT | Performed by: PSYCHIATRY & NEUROLOGY

## 2024-08-04 PROCEDURE — 1240000001 HC SEMI-PRIVATE BH ROOM DAILY

## 2024-08-04 PROCEDURE — 2500000001 HC RX 250 WO HCPCS SELF ADMINISTERED DRUGS (ALT 637 FOR MEDICARE OP): Performed by: PSYCHIATRY & NEUROLOGY

## 2024-08-04 RX ORDER — CITALOPRAM 20 MG/1
20 TABLET, FILM COATED ORAL DAILY
Qty: 7 TABLET | Refills: 0 | Status: SHIPPED | OUTPATIENT
Start: 2024-08-05 | End: 2024-08-12

## 2024-08-04 RX ORDER — BUPROPION HYDROCHLORIDE 100 MG/1
100 TABLET, EXTENDED RELEASE ORAL 2 TIMES DAILY
Qty: 60 TABLET | Refills: 3 | Status: SHIPPED | OUTPATIENT
Start: 2024-08-04 | End: 2024-12-02

## 2024-08-04 RX ORDER — MIRTAZAPINE 15 MG/1
15 TABLET, FILM COATED ORAL NIGHTLY
Qty: 30 TABLET | Refills: 3 | Status: SHIPPED | OUTPATIENT
Start: 2024-08-04 | End: 2024-12-02

## 2024-08-04 RX ORDER — IBUPROFEN 200 MG
1 TABLET ORAL DAILY
Qty: 30 PATCH | Refills: 0 | Status: SHIPPED | OUTPATIENT
Start: 2024-08-05 | End: 2024-09-30

## 2024-08-04 ASSESSMENT — PAIN - FUNCTIONAL ASSESSMENT
PAIN_FUNCTIONAL_ASSESSMENT: 0-10
PAIN_FUNCTIONAL_ASSESSMENT: 0-10

## 2024-08-04 ASSESSMENT — COLUMBIA-SUICIDE SEVERITY RATING SCALE - C-SSRS
2. HAVE YOU ACTUALLY HAD ANY THOUGHTS OF KILLING YOURSELF?: NO
6. HAVE YOU EVER DONE ANYTHING, STARTED TO DO ANYTHING, OR PREPARED TO DO ANYTHING TO END YOUR LIFE?: NO
1. SINCE LAST CONTACT, HAVE YOU WISHED YOU WERE DEAD OR WISHED YOU COULD GO TO SLEEP AND NOT WAKE UP?: NO

## 2024-08-04 ASSESSMENT — PAIN SCALES - GENERAL
PAINLEVEL_OUTOF10: 0 - NO PAIN
PAINLEVEL_OUTOF10: 0 - NO PAIN

## 2024-08-04 NOTE — GROUP NOTE
Group Topic: Team Building   Group Date: 8/4/2024  Start Time: 0930  End Time: 1020  Facilitators: YOLANDA Harris   Department: [facility] REHAB THERAPY VIRTUAL    Number of Participants: 11   Group Focus: check in, feeling awareness/expression, goals, healthy friendships, and social skills  Treatment Modality: Recreation Therapy  Interventions utilized were: Adult Goals Discussion, Common and Unique (team building), exploration and support  Purpose: coping skills, communication skills, insight or knowledge, and self-care    Name: Maddie Bautista YOB: 1970   MR: [id number]      Facilitator: Recreational Therapist  Level of Participation: active  Quality of Participation: appropriate/pleasant, attentive, cooperative, engaged, and initiates communication  Interactions with others: appropriate and supportive  Mood/Affect: appropriate and positive  Triggers (if applicable): N/A  Cognition: coherent/clear and capable  Progress: Moderate  Comments: This session involved discussing in general terms goals that all adults living in this world could and should work on. Participants were asked to brainstorm goals and share them with the group (included: independence, respect, communication, planning for the future, and discipline). Participants were asked to complete a team-building activity called “Common and Unique”. Groups of peers were asked to take so much time and make a list of commonalities they all share. Then they were asked to identify one thing unique only to them that no one else in the group shared. Lists and the session were processed as a large group.     Patient attended the entire session and completed all group tasks. She was able to share her thoughts related to the brainstormed adult goals. Patient worked well with her smaller group and was an active participant. Patient reported that she enjoyed the activity.     Plan: continue with services

## 2024-08-04 NOTE — CARE PLAN
"Patient is pleasant and cooperative so far this shift. She denies anxiety, depression \"always\", denies SI/HI, and AH/VH. New order for discharge tomorrow   1300. She attends and participates in all groups. She is social with peers. No PRN medications given so far this shift. 15 minute safety checks maintained.   "

## 2024-08-04 NOTE — NURSING NOTE
Pt took her night time medications   Pt went to sleep and had an uneventful night  Pt slept all night long

## 2024-08-04 NOTE — GROUP NOTE
Group Topic: Art Creative   Group Date: 8/4/2024  Start Time: 1100  End Time: 1210  Facilitators: AYAH HarrisS   Department: [facility] REHAB THERAPY VIRTUAL    Number of Participants: 13   Group Focus: coping skills, leisure skills, mindfulness, and relaxation  Treatment Modality: Recreation Therapy  Interventions utilized were: Watercolor Painting Velvet Images, Sticker Puzzles, Music, leisure development  Purpose: Leisure Awareness, Social Stimulation, Creative Expression, coping skills    Name: Maddie Bautista YOB: 1970   MR: [id number]      Facilitator: Recreational Therapist  Level of Participation: active  Quality of Participation: appropriate/pleasant, cooperative, engaged, and initiates communication  Interactions with others: appropriate and supportive  Mood/Affect: appropriate  Triggers (if applicable): N/A  Cognition:  capable  Progress: Moderate  Comments:  Session included creative activities (velvet water color posters, sticker puzzles) which were offered for 70 minutes. Patients were encouraged to participate in the activity, practice being in the moment, and relax.     Patient worked on a water color painting independently. She appeared to be focused and enjoying the session.     Plan: continue with services

## 2024-08-04 NOTE — GROUP NOTE
"Group Topic: Dialectical Behavioral Therapy - Emotional Regulation   Group Date: 8/4/2024  Start Time: 1400  End Time: 1510  Facilitators: YOLANDA Harris   Department: [facility] REHAB THERAPY VIRTUAL    Number of Participants: 11   Group Focus: coping skills, feeling awareness/expression, and personal responsibility  Treatment Modality: Recreation Therapy  Interventions utilized were: DBT-ER-A.B.C. PLEASE with added B and C,  exploration, patient education, and support  Purpose: coping skills, maladaptive thinking, insight or knowledge, and self-worth    Name: Maddie Bautista YOB: 1970   MR: [id number]      Facilitator: Recreational Therapist  Level of Participation: active  Quality of Participation: cooperative, engaged, and initiates communication  Interactions with others: appropriate and asked thoughtful questions  Mood/Affect: appropriate, verbalized depressive symptoms  Triggers (if applicable): N/A  Cognition: coherent/clear and capable  Progress: Moderate  Comments: Group involved education around the A.B.C. P.L.E.A.S.E. acronym (accumulate short and long term positive emotions, build mastery, cope ahead of time, and please take care of mind by taking care of body (treat physical illness, no drugs, get sleep, eat well, and get exercise). Additional time was spent on \"building mastery\" and \"coping ahead of time with difficult situations\". In addition the \"states of mind\" were discussed to aid many of the group members who haven't experienced DBT skill education.     Ms. Bautista attended the entire session and completed all desired group tasks. She voiced that she has depressive thinking in which she tells herself \"she isn't worth it\" or \"doesn't matter\". Patient also discussed having a history of an eating which she connects with \"control issues\". Patient asked, \"if not caring about myself is the problem how do I cope?\". The group used her question to apply the steps discussed with " "\"coping ahead of time\" in addition to this documenting CTRS incorporating some additional coping strategies. Patient identified discussing through this was helpful. She is also looking forward to outpatient 1:1 therapy if possible. Her peers were supportive of her.      Plan: continue with services      "

## 2024-08-04 NOTE — NURSING NOTE
"Pt interview in the patients room  Pt stated she is feeling tired today and her day was \"good\"  Pt stated her goal \"get a peaceful sleep' her strength ' I am a people person\"  and her coping skill \"I deep breath\"  Pt denied everything at this time  Pt is appropriate with her answers to the questions at this time   "

## 2024-08-04 NOTE — CARE PLAN
"The patient's goals for the shift include \"get peaceful sleep\"    The clinical goals for the shift include medication compliance    Over the shift, the patient did not make progress toward the following goals. Barriers to progression include medication compliance. Recommendations to address these barriers include more education on medications.    "

## 2024-08-04 NOTE — PROGRESS NOTES
"Maddie Bautista is a 54 y.o. female on day 3 of admission presenting with Suicidal ideation.      Subjective   The patient was seen and examined. I reviewed the chart and vital signs from overnight. I reviewed previous notes. I reviewed medications, administered overnight and their reported benefits or side effects.  Patient reported, she is sleeping ok, motivated to work on reducing her anxiety,coping skills.        Objective     Last Recorded Vitals  Blood pressure 153/82, pulse 74, temperature 36.3 °C (97.3 °F), resp. rate 18, height 1.611 m (5' 3.43\"), weight 70.9 kg (156 lb 4.9 oz), SpO2 97%.    Review of Systems    Psychiatric ROS - Adult  Anxiety: 3/10  Depression: anhedonia  Delirium: negative  Psychosis: negative  Ashley: negative  Safety Issues: none    MSE:                                                                                                             General:   Appearance: Appears stated age, dressed in hospital attire, appropriate grooming and hygiene  Attitude:  Calmer, cooperative  Behavior:  Appropriate eye contact, improved attitude to admission noted  Movement: No psychomotor agitation or retardation. No EPS/TD. Normal gait and station. Normal muscle tone/bulk.  Speech and language: Regular rate, rhythm, volume and tone, spontaneous, fluent.   Mood: denied depression, anxiety 3  Affect: bright, euthymic  Thought process:  linear, organized, logical Linear, goal directed  Thought content:  Does not endorse suicidal ideation or homicidal ideations, no delusions elicited.  Perception: Does not endorse auditory/visual hallucinations. Does not appear to be responding to hallucinatory stimuli. No perceptual abnormalities noted  Cognition:  alert and oriented x 4, short and long term memory grossly intact,  attention and concentration grossly intact   Insight:  Fair, as patient recognizes symptoms of illness and need for recommended treatments.   Judgment: Can make reasonable decisions about " ordinary activities of daily living and necessary medical care recommendations    IMPRESSION/PLAN OF CARE:  Major Depressive Disorder, severe, without psychosis  Suicidal ideation  Former smoker  Cannabis dependence  Grief, complicated    Medication recommendations:   Has not been on celexa for week  Stop celexa  Start wellbutrin ir 75mg now and 100mg sr bid in am  Start remeron 15mg at bedtime  Start xanax 0.5mg bid prn    8/2: reported mild intermittent flushed feeling. Restart celexa at 20mg with more gradual taper. Continue remeron 15mg and wellbutrin sr 100mg bid   8/3: continue to monitor, monitor mood, thought content, group adherence  8/4: continue to monitor dc I am if stable overnight, family will be called tomorrow to re-assess risk    I spent 26 minutes in the professional and overall care of this patient.      Katya Celis MD

## 2024-08-05 VITALS
DIASTOLIC BLOOD PRESSURE: 81 MMHG | SYSTOLIC BLOOD PRESSURE: 170 MMHG | RESPIRATION RATE: 18 BRPM | HEIGHT: 63 IN | BODY MASS INDEX: 28.71 KG/M2 | TEMPERATURE: 96.4 F | OXYGEN SATURATION: 100 % | WEIGHT: 162.04 LBS | HEART RATE: 59 BPM

## 2024-08-05 PROCEDURE — S4991 NICOTINE PATCH NONLEGEND: HCPCS | Performed by: INTERNAL MEDICINE

## 2024-08-05 PROCEDURE — 2500000001 HC RX 250 WO HCPCS SELF ADMINISTERED DRUGS (ALT 637 FOR MEDICARE OP): Performed by: PSYCHIATRY & NEUROLOGY

## 2024-08-05 PROCEDURE — 2500000002 HC RX 250 W HCPCS SELF ADMINISTERED DRUGS (ALT 637 FOR MEDICARE OP, ALT 636 FOR OP/ED): Performed by: INTERNAL MEDICINE

## 2024-08-05 PROCEDURE — 97150 GROUP THERAPEUTIC PROCEDURES: CPT | Mod: GO,CO

## 2024-08-05 PROCEDURE — 99239 HOSP IP/OBS DSCHRG MGMT >30: CPT | Performed by: PSYCHIATRY & NEUROLOGY

## 2024-08-05 RX ORDER — ALPRAZOLAM 1 MG/ML
0.25 SOLUTION, CONCENTRATE ORAL 2 TIMES DAILY PRN
Qty: 4 ML | Refills: 0 | Status: SHIPPED | OUTPATIENT
Start: 2024-08-05 | End: 2024-08-08

## 2024-08-05 ASSESSMENT — PAIN - FUNCTIONAL ASSESSMENT: PAIN_FUNCTIONAL_ASSESSMENT: 0-10

## 2024-08-05 ASSESSMENT — PAIN SCALES - GENERAL: PAINLEVEL_OUTOF10: 0 - NO PAIN

## 2024-08-05 NOTE — PROGRESS NOTES
"Occupational Therapy     REHAB Therapy Assessment & Treatment    Patient Name: Maddie Bautista  MRN: [id number]  Today's Date: 8/5/2024      Activity Assessment:     Music as a Coping Tool/ Self Expression Group: [id number]  FRANCIA/Positive Supports Group:   Mental Health Recovery Group: [id number]    3/3 Groups Attended      Pt present and engages well in all groups this date with continued appropriate mood, brighter affect 100% of the time, and receptive to all asked tasks of her as compared to previous week. Pt with G understanding of using music as a coping tool as well as eager to engage in self expression portion of same group. Pt with appropriate sharing during FRANCIA group of feelings of discharge stating \"Im excited to go but Im nervous to face my coworkers. They were the ones who called 911 and I just dont know how I will face them\" Pt's feelings validated as well as education provided on alternative ways to ID solutions for her concerns. Pt receptive to all as well as receptive to handouts/education provided on positive supports like FRANCIA. Pt continues to make improvement toward OT goals as evidenced above. Pt would benefit from continued OT services in order to improve overall self-esteem, personal confidence and positive supports for safe transition at discharge.      Encounter Problems       Encounter Problems (Active)       OT Goals       Pt will explore and ID 1-2 strategies to manage stressors/symptoms of illness/ grief more effectively prior to discharge.   (Progressing)       Start:  08/02/24    Expected End:  08/30/24            Pt will demonstrate improved self-esteem and awareness by independently identifying 2-3 personal strengths and 2-3 difficulties faced in daily living.    (Progressing)       Start:  08/02/24    Expected End:  08/30/24            Pt will ID 2 community resources/programs to join/attend after D/C to improve their support system  (Progressing)       Start:  08/02/24    " Expected End:  08/30/24            Pt will ID/ utilize 1-2 ways to increase balance of activity/ re-involve self in functional daily routines/roles prior to discharge.   (Progressing)       Start:  08/02/24    Expected End:  08/30/24            Pt will explore and ID 1-2 effective methods of expressing feelings, want and needs prior to discharge.   (Progressing)       Start:  08/02/24    Expected End:  08/30/24                         Additional Comments:    LANDIN collaborated with patients nurse and charge nurse throughout the day to provide appropriate support and encouragement to attend groups. Pt up on unit when LANDIN left last group of the day. All needs met.

## 2024-08-05 NOTE — NURSING NOTE
"Pt interview in the front lounge  Pt is eating her snack and coloring  Pt stated her day was \"good\"  Pt rated her anxiety 0/10  depression 0/10  and denied everything else at this time Pt stated her goal \"get sleep\"  her strength \"I work well with people\"  Pt is appropriate with her answers to the questions at this time  "

## 2024-08-05 NOTE — NURSING NOTE
Pt took her night time medications  Pt was coloring and then went to bed  Pt to be discharged today

## 2024-08-05 NOTE — NURSING NOTE
"Patient was discharged from the unit.  Patient's  came up to the floor and waited just outside the unit for patient to be escorted out by a staff member.  Patient denied depression and denied thoughts of self-harm.  During assessment the patient became tearful and stated was anxious about returning to work and seeing the \"people who put me here\".  Pt stated she is close with her coworkers and she knows they did what they did because they were concerned about her safety.  Patient was given a letter for her employer excusing her from work while she was hospitalized.    "

## 2024-08-05 NOTE — DISCHARGE INSTR - APPOINTMENTS
Outpatient Mental Health Appointments:    Therapy: with Tamiko Malone (Virtual Appointment), On: 08/14/2024 at 11:00 AM  Psychiatry: with Dr. Yue MD (Virtual Appointment), On: 08/20/2024 at 10:00 AM.    Both of the above virtual appointments are through:    [facility] Office,   [address] [place] [address].  [address] [address].    P. [phone];  F. [phone];

## 2024-08-05 NOTE — GROUP NOTE
Group Topic: Music Therapy   Group Date: 8/5/2024  Start Time: 0935  End Time: 1020  Facilitators: Nikki Dixon   Department: [facility] EXPRESSIVE THER VIRTUAL    Number of Participants: 11   Group Focus: calming/relaxation, expressive outlet, and music therapy  Treatment Modality: Music Therapy  Interventions Utilized were: active music engagement, facilitated relaxation, and sharing/discussion    Pts were presented with ocean drum and steel tongue drum and given an opportunity to play each one individually. Pts then chose an instrument and worked with a partner to create a joint improvisation. MT then led pts through a facilitated relaxation with beach imagery. Opportunities for discussion and sharing were provided throughout.      Name: Maddie Bautista YOB: 1970   MR: [id number]      Level of Participation: active  Quality of Participation: appropriate/pleasant, engaged, and supportive  Interactions with others: supportive  Mood/Affect: appropriate  Cognition, Pre Treatment: attentive  Cognition, Post Treatment: capable  Progress: Moderate  Plan: continue with services    Pt readily engaged in all experiences presented. She was supportive and positive with peers throughout and contributed to group discussion. She shared with MT that she was working on loving herself and that she currently had no self-acceptance. MT provided encouragement and referenced relaxation experience and techniques pt could try.

## 2024-08-05 NOTE — GROUP NOTE
"Group Topic: Goals   Group Date: 8/5/2024  Start Time: 0730  End Time: 0810  Facilitators: YOLANDA Gamble   Department: [facility] REHAB THERAPY VIRTUAL    Number of Participants: 12   Group Focus: check in, daily focus, goals, and social skills  Treatment Modality: Recreational Therapy   Interventions utilized were Daily Check In, Relaxation Beach Ball, exploration and support  Purpose: feelings and self-care, personal goals, gratitude, positive intentions     Name: Maddie Bautista YOB: 1970   MR: [id number]      Facilitator: Recreational Therapist  Level of Participation: active  Quality of Participation: appropriate/pleasant, cooperative, and engaged  Interactions with others: appropriate, supportive, and offered helpful suggestions  Mood/Affect: appropriate and positive  Triggers (if applicable): n/a  Cognition: coherent/clear and goal directed  Progress: Moderate  Comments: Patients were provided with the \"Daily Check In\" handout for morning goal group. The handout includes 4 main sections - (current mood, gratitude, goals, and intentions for the day).     Pt was fully engaged in morning session, she participated ice breaker (relaxation beach ball) and completed goal worksheet. She identified that she is grateful for \"family\", \"staff the has help me here\", and \"life\". Goals that she has set for herself include, \"asking for help when I need it\" and \"prioritize me\".     Plan: continue with services      "

## 2024-08-05 NOTE — DISCHARGE SUMMARY
Discharge Summary      Admission Reason: suicidal plan, depression 6 months, onset, off Celexa, #90 days   Discharge Destination: home with family      Discharge Diagnosis:  Major Depressive Disorder, severe, without psychosis  Suicidal ideation  Former smoker  Cannabis dependence  Grief, complicated    HISTORY OF PRESENT ILLNESS:    Mrs Bautista is a  employed 55 yo female with a history of trauma, celexa for years, filled for 90 days in  she ran out, has not gone back to renew, feels medicine effect had worn off. Reported her depression spiked few months ago, has been feeling irritable, worthless. She reported financial stressors, grieving several family members diagnosed with cancer or illness. She states she saw therapist years ago in  due to a difficult divorce. Saw therapy once and did not return, felt she could manage with the coping sheets. , was present during part of the assessment reported patient's mood has worsened, more irritable. She had made no statements to him, he was not aware of her suicidal thoughts. She was upset that she was not being paid well at work she has asked for income increase with no effort. She stated she lost her temper and told the office she was going to harm herself, so they would take her stress at work, seriously. She mentioned aunt with thyroid ca, ia with melanoma dx , best friend   from ca, a sister diagnosed with breast ca. She has not seen a therapist since . She stated she is open to getting help. Patient was able to contract for safety.      Hospital course:  Mrs. Ross was started on bupropion for mood, anxiety and added benefit of smoking cessation. Celexa was reduced and continued, holding Celexa led to mild withdrawal symptoms of hot flashes. Taper of celexa can be completed outpatient over the next several weeks. Patient processed her depression, attended and was compliant and motivated to attend all group  "therapy sessions during this admission. Patient reported improved sleep on Remeron 15mg with reduction in daytime anxiety. Education was provided for bupropion to monitor for side effects of brain fog and fatigue and monitor for potential weigh gain on Remeron if occurs to discuss with outpatient team in a few weeks, once sleep has stabilized, for alternative such as trazodone. The patient was seen daily by the team, which included the provider, nursing, occupational therapy and social work. Patient received education regarding their diagnosis and treatment plan. Patient was visible on the unit, medication compliant, cooperative  with care and help seeking. The patient attended group therapy, worked on individualized coping skills and was goal oriented to the future and to ongoing stabilization of their mental health needs.     MMSE:                                                                                                                       General:   Appearance: appropriate grooming and hygiene, appears stated age  Attitude:  calm, cooperative  Behavior: appropriate eye contact  Movement: no psychomotor agitation or retardation. No EPS/TD. Normal gait and station. Normal muscle tone/bulk.  Speech and language:  regular rate, rhythm, volume and tone, spontaneous, fluent.   Mood: \"good!\"  Affect:  euthymic, full range, affect/mood congruent  Thought process: linear, organized, logical, goal directed thinking and processing  Thought content: does not endorse suicidal ideation or homicidal ideations, no delusions elicited.  Perception: does not endorse auditory/visual hallucinations. Does not appear to be responding to hallucinatory stimuli, no olfactory, somatic or tactile hallucinations were appreciated.  Cognition:  alert and oriented to person, place, time and purpose, short and long term memory within normal limits, attention and concentration within normal limits  Insight:  fair, as patient recognizes " symptoms of illness and need for recommended treatments.   Judgment:  can make reasonable decisions about ordinary activities of daily living and necessary medical care recommendations    LABS   Electrocardiogram, 12-lead    Result Date: 8/1/2024  Unusual P axis, possible ectopic atrial rhythm Low voltage QRS Inferior infarct , age undetermined Cannot rule out Anterior infarct , age undetermined Abnormal ECG No previous ECGs available See ED provider note for full interpretation and clinical correlation Confirmed by Anne Marie Summers ([id number]) on 8/1/2024 12:45:25 PM    [id number]    FUNCTIONAL ESTIMATES  Estimate of Intelligence: above average   Estimate of Capacity for Activities of Daily Living:   independent       A safety risk assessment was completed on the day of discharge. The patient is judged a minimal suicide risk due to:  1)  No access to guns.  2) Denied prior suicide attempts  3) Denies current suicidal ideation or plans  4) Goal directed to the future: therapy  5) No current symptoms of a major depressive episode.  The team deemed the patient to be a low risk of self harm and recommend the patient for discharge today.    Substance Use Risk Assessment:  Nicotine: Risks of continued tobacco use was addressed with the patient which included: inpatient education and counseling of the risks of oral, esophageal as well as other organ cancers (including oral, dermatological, gastric, pancreatic, respiratory) along with the ongoing risk of neurological and cardiovascular disease/events (strokes, angina). Treatment options for cessation were offered to include: alternate tobacco products, both inpatient/outpatient counseling. Replacement products were offered during this admission and prescribed at the time of discharge along with a referral to outpatient cessation counseling.  Cannabis use: Patient was counseled on longer term effects on central nervous system receptors with chronic frequent use of cannabis, risk of  dependence psychological, financial, drug interactions, sedative effects with mental health medications.    I spent over 30 minutes in the preparation of this summary. All 11 elements of the transition record were discussed with the patient and or caregiver and the receiving inpatient facility (if applicable).  A copy of the transition record was given to the patient and was transmitted to the outpatient provider accepting the patient's care following  discharge.    Patient's illness, medication/potential for medication side effects, and the medication recommended along with the importance of mediation compliance benefits and risk were reviewed prior to discharge with the patient and with designated family member patient (if applicable).  The patient voiced understanding of their diagnosis and treatment plan.  The patient was counseled not to stop medications without the supervision of a psychiatrist.  The patient was counseled to follow-up with their outpatient medical provider as indicated.   The patient was counseled that if there was an increase in mental health issues, depression, anxiety, medication side effects, self harming thoughts or thoughts to harm others, to call [facility] or 911 and come to the nearest emergency room.   The patient also received information regarding advanced mental and medical health directives during this hospitalization which they could discuss with their outpatient provider.   The plan was discussed with the patient, the nurse and the social work department. The patient voiced understanding and agreement with the plan.       Medications on Discharge:  buPROPion SR, 100 mg, oral, BID  citalopram, 20 mg, oral, Daily  mirtazapine, 15 mg, oral, Nightly  nicotine, 1 patch, transdermal, Daily  triamterene-hydrochlorothiazid, 1 tablet, oral, Daily      these medications from [id number] [place], [place] - [address] AT [facility] [address]  ALPRAZolam  IntensoL  buPROPion SR  citalopram  mirtazapine  nicotine      Follow up appointments:       Outpatient Mental Health Appointments:     Therapy: with Tamiko Malone (Virtual Appointment), On: 08/14/2024 at 11:00 AM  Psychiatry: with Dr. Yue MD (Virtual Appointment), On: 08/20/2024 at 10:00 AM.     Both of the above virtual appointments are through:     [facility],   [address] [place] [address].  [address],  [place] [address].     P. [phone];  F. [phone];        Community Resources  Crisis Center Hotline:  National Suicide  & Crisis Prevention Lifeline: Call or Text 051 or 1-498.746.9570 (TALK)  Crisis Text Line: Text HOME to 296367    Katya Celis MD

## 2024-08-07 NOTE — SIGNIFICANT EVENT
Follow Up Phone Call    Outgoing phone call    Spoke to: Maddie Bautista Relationship:self   Phone number: [phone]      Outcome: contacted patient/ family   No chief complaint on file.         Diagnosis:Not applicable    States she is feeling better. Denies suicidal ideation at this time. Has emergency contact numbers on her person. Encouraged compliance with medications and appointments. Voiced understanding.     States she was prescribed Xanax as a liquid, she is requesting tablets. Dr Celis was notified. No further questions or concerns.

## 2024-08-08 RX ORDER — ALPRAZOLAM 1 MG/ML
0.25 SOLUTION, CONCENTRATE ORAL 2 TIMES DAILY PRN
Qty: 4 ML | Refills: 0 | Status: SHIPPED | OUTPATIENT
Start: 2024-08-08 | End: 2024-08-08 | Stop reason: HOSPADM

## 2024-08-08 RX ORDER — ALPRAZOLAM 0.25 MG/1
0.25 TABLET ORAL 2 TIMES DAILY PRN
Qty: 20 TABLET | Refills: 2 | Status: SHIPPED | OUTPATIENT
Start: 2024-08-08 | End: 2024-10-07

## 2024-12-12 ENCOUNTER — OFFICE VISIT (OUTPATIENT)
Dept: URGENT CARE | Age: 54
End: 2024-12-12
Payer: COMMERCIAL

## 2024-12-12 VITALS
OXYGEN SATURATION: 96 % | HEART RATE: 98 BPM | SYSTOLIC BLOOD PRESSURE: 132 MMHG | DIASTOLIC BLOOD PRESSURE: 80 MMHG | TEMPERATURE: 99.6 F | BODY MASS INDEX: 31.91 KG/M2 | HEIGHT: 61 IN | WEIGHT: 169 LBS

## 2024-12-12 DIAGNOSIS — R05.9 COUGH, UNSPECIFIED TYPE: ICD-10-CM

## 2024-12-12 DIAGNOSIS — R68.89 FLU-LIKE SYMPTOMS: ICD-10-CM

## 2024-12-12 DIAGNOSIS — J40 BRONCHITIS: Primary | ICD-10-CM

## 2024-12-12 LAB
POC RAPID INFLUENZA A: NEGATIVE
POC RAPID INFLUENZA B: NEGATIVE
POC SARS-COV-2 AG BINAX: NORMAL

## 2024-12-12 RX ORDER — VENLAFAXINE HYDROCHLORIDE 150 MG/1
CAPSULE, EXTENDED RELEASE ORAL
COMMUNITY
Start: 2024-12-08

## 2024-12-12 RX ORDER — PREDNISONE 10 MG/1
TABLET ORAL
Qty: 21 TABLET | Refills: 0 | Status: SHIPPED | OUTPATIENT
Start: 2024-12-12 | End: 2024-12-17

## 2024-12-12 RX ORDER — AZITHROMYCIN 250 MG/1
TABLET, FILM COATED ORAL
Qty: 6 TABLET | Refills: 0 | Status: SHIPPED | OUTPATIENT
Start: 2024-12-12

## 2024-12-12 RX ORDER — ALBUTEROL SULFATE 90 UG/1
2 INHALANT RESPIRATORY (INHALATION) EVERY 6 HOURS PRN
Qty: 8 G | Refills: 0 | Status: SHIPPED | OUTPATIENT
Start: 2024-12-12

## 2024-12-12 ASSESSMENT — ENCOUNTER SYMPTOMS
DIAPHORESIS: 1
EYES NEGATIVE: 1
FATIGUE: 1
CARDIOVASCULAR NEGATIVE: 1
COUGH: 1
SHORTNESS OF BREATH: 1

## 2024-12-12 NOTE — PROGRESS NOTES
Subjective   Patient ID: Maddie Bautista is a 54 y.o. female. They present today with a chief complaint of Cough (Burning in chest, SOB, exhaustion since Monday /Can't catch breath, body aches, chills ).    History of Present Illness  HPI    Past Medical History  Allergies as of 12/12/2024 - Reviewed 12/12/2024   Allergen Reaction Noted    Nitrofurantoin macrocrystalline Hives 11/08/2012    Penicillins Hives 10/02/2003    Tetracycline Hives 07/31/2024       (Not in a hospital admission)       Past Medical History:   Diagnosis Date    Abrasion of breast, unspecified breast, initial encounter     Breast abrasion    Depression     Other specified postprocedural states     Post-operative state    Overweight     Overweight (BMI 25.0-29.9)    Personal history of diseases of the skin and subcutaneous tissue     History of cyst of breast    Personal history of other diseases of the digestive system     History of constipation    Personal history of other medical treatment     History of ultrasound, pelvic    Personal history of other mental and behavioral disorders     History of depression    Personal history of other specified conditions     History of abdominal pain    PTSD (post-traumatic stress disorder)     Unspecified disorder of synovium and tendon, unspecified shoulder     Rotator cuff disorder    Unspecified thoracic, thoracolumbar and lumbosacral intervertebral disc disorder     Disc disorder       Past Surgical History:   Procedure Laterality Date    HYSTEROSCOPY  10/20/2014    Hysteroscopy With Endometrial Ablation    INCISIONAL BREAST BIOPSY  10/20/2014    Incisional Breast Biopsy    LUMBAR LAMINECTOMY  10/18/2016    Laminectomy Lumbar    TONSILLECTOMY  09/24/2014    Tonsillectomy    TUBAL LIGATION  10/20/2014    Tubal Ligation        reports that she has been smoking cigarettes. She started smoking about a year ago. She has a 0.5 pack-year smoking history. She has never used smokeless tobacco. She reports  "current alcohol use of about 1.0 standard drink of alcohol per week. She reports current drug use. Drug: Marijuana.    Review of Systems  Review of Systems   Constitutional:  Positive for diaphoresis and fatigue.   HENT: Negative.     Eyes: Negative.    Respiratory:  Positive for cough and shortness of breath.    Cardiovascular: Negative.                                   Objective    Vitals:    12/12/24 0912   BP: 132/80   Pulse: 98   Temp: 37.6 °C (99.6 °F)   TempSrc: Temporal   SpO2: 96%   Weight: 76.7 kg (169 lb)   Height: 1.549 m (5' 1\")     No LMP recorded.    Physical Exam  Constitutional:       Appearance: Normal appearance.   HENT:      Mouth/Throat:      Pharynx: Posterior oropharyngeal erythema and postnasal drip present.   Eyes:      Conjunctiva/sclera: Conjunctivae normal.   Cardiovascular:      Rate and Rhythm: Normal rate and regular rhythm.   Pulmonary:      Breath sounds: Normal breath sounds and air entry.   Neurological:      Mental Status: She is alert.         Procedures    Point of Care Test & Imaging Results from this visit  Results for orders placed or performed in visit on 12/12/24   POCT Influenza A/B manually resulted   Result Value Ref Range    POC Rapid Influenza A Negative Negative    POC Rapid Influenza B Negative Negative   POCT BinaxNOW Covid-19 Ag Card manually resulted   Result Value Ref Range    POC OSMAN-COV-2 AG  Presumptive negative test for SARS-CoV-2 (no antigen detected)     Presumptive negative test for SARS-CoV-2 (no antigen detected)      No results found.    Diagnostic study results (if any) were reviewed by Rosita Mistry MD.    Assessment/Plan   Allergies, medications, history, and pertinent labs/EKGs/Imaging reviewed by Rosita Mistry MD.     Medical Decision Making      Orders and Diagnoses  Diagnoses and all orders for this visit:  Flu-like symptoms  -     POCT Influenza A/B manually resulted  Cough, unspecified type  -     POCT BinaxNOW Covid-19 Ag Card manually " resulted      Medical Admin Record      Patient disposition: Home    Electronically signed by Rosita Mistry MD  9:25 AM

## 2024-12-12 NOTE — LETTER
December 12, 2024     Patient: Maddie Bautista   YOB: 1970   Date of Visit: 12/12/2024       To Whom It May Concern:    Maddie Bautista was seen in my clinic on 12/12/2024 at 9:15 am. Please excuse Maddie for her absence from work on this day to make the appointment as well as tomorrow 12/13/24.     If you have any questions or concerns, please don't hesitate to call.         Sincerely,         Rosita Mistry MD        CC: No Recipients

## 2024-12-12 NOTE — PATIENT INSTRUCTIONS
Do not smoke    Take Mucinex-DM as needed up to 2 times a day  Drink plenty of water   Humidifier in sleeping areas

## 2025-04-04 ENCOUNTER — OFFICE VISIT (OUTPATIENT)
Dept: PRIMARY CARE | Facility: CLINIC | Age: 55
End: 2025-04-04
Payer: COMMERCIAL

## 2025-04-04 VITALS
HEIGHT: 61 IN | DIASTOLIC BLOOD PRESSURE: 78 MMHG | TEMPERATURE: 96.4 F | SYSTOLIC BLOOD PRESSURE: 132 MMHG | HEART RATE: 111 BPM | WEIGHT: 162 LBS | BODY MASS INDEX: 30.58 KG/M2 | OXYGEN SATURATION: 97 %

## 2025-04-04 DIAGNOSIS — Z13.6 SCREENING FOR CARDIOVASCULAR CONDITION: ICD-10-CM

## 2025-04-04 DIAGNOSIS — Z23 ENCOUNTER FOR IMMUNIZATION: ICD-10-CM

## 2025-04-04 DIAGNOSIS — Z80.3 FAMILY HISTORY OF BREAST CANCER: ICD-10-CM

## 2025-04-04 DIAGNOSIS — Z71.85 VACCINE COUNSELING: ICD-10-CM

## 2025-04-04 DIAGNOSIS — Z12.31 ENCOUNTER FOR SCREENING MAMMOGRAM FOR MALIGNANT NEOPLASM OF BREAST: ICD-10-CM

## 2025-04-04 DIAGNOSIS — Z12.39 ENCOUNTER FOR BREAST CANCER SCREENING USING NON-MAMMOGRAM MODALITY: ICD-10-CM

## 2025-04-04 DIAGNOSIS — H81.09 MENIERE'S DISEASE, UNSPECIFIED LATERALITY: ICD-10-CM

## 2025-04-04 DIAGNOSIS — Z00.00 ANNUAL PHYSICAL EXAM: Primary | ICD-10-CM

## 2025-04-04 DIAGNOSIS — R15.9 INCONTINENCE OF FECES, UNSPECIFIED FECAL INCONTINENCE TYPE: ICD-10-CM

## 2025-04-04 DIAGNOSIS — Z91.89 INCREASED RISK OF BREAST CANCER: ICD-10-CM

## 2025-04-04 DIAGNOSIS — Z00.00 PREVENTATIVE HEALTH CARE: ICD-10-CM

## 2025-04-04 DIAGNOSIS — E55.9 VITAMIN D DEFICIENCY: ICD-10-CM

## 2025-04-04 RX ORDER — TRIAMTERENE/HYDROCHLOROTHIAZID 37.5-25 MG
1 TABLET ORAL
Qty: 90 TABLET | Refills: 1 | Status: SHIPPED | OUTPATIENT
Start: 2025-04-04 | End: 2025-10-01

## 2025-04-04 RX ORDER — VENLAFAXINE 37.5 MG/1
37.5 TABLET ORAL DAILY
COMMUNITY

## 2025-04-04 RX ORDER — DIAZEPAM 2 MG/1
2 TABLET ORAL EVERY 8 HOURS PRN
COMMUNITY

## 2025-04-04 ASSESSMENT — PATIENT HEALTH QUESTIONNAIRE - PHQ9
2. FEELING DOWN, DEPRESSED OR HOPELESS: NOT AT ALL
SUM OF ALL RESPONSES TO PHQ9 QUESTIONS 1 AND 2: 0
1. LITTLE INTEREST OR PLEASURE IN DOING THINGS: NOT AT ALL

## 2025-04-04 ASSESSMENT — PAIN SCALES - GENERAL: PAINLEVEL_OUTOF10: 0-NO PAIN

## 2025-04-04 NOTE — PATIENT INSTRUCTIONS
It was a pleasure to meet you today.    Pneumonia vaccination (Prevnar 20) in clinic today.  -You are also due for the shingles vaccination series (Shingrix), which you have declined for now.  Feel free to return for a nurse visit when/if you decide to get this vaccine.    Go to the lab for fasting blood work, we will notify you of all results.    Mammogram order entered today, call or schedule at .  -Due to your significant family history of breast cancer, we will alternate breast MRIs every 6-months with your mammogram.  Future MRI order entered today.    Gynecology and pelvic floor physical therapy referrals entered today, call to schedule.    Triamterene-hydrochlorothiazide refill sent to your pharmacy.    I recommend that you continue seeing your psychiatrist as we discussed given hospitalization last year.    Routine follow-up/medication review with me in 6 months, sooner if needed.

## 2025-04-04 NOTE — PROGRESS NOTES
"Subjective   Maddie Bautista is a 54 y.o. female who presents for Annual Exam (Wellness and refills ).    HPI:      PREVENTATIVE HEALTH CARE:    Immunizations:  COVID:  DUE  TDaP:  utd  Pneumonia:  DUE, current smoker  Shingles:  DUE    Immunization History   Administered Date(s) Administered    COVID-19, mRNA, LNP-S, PF, 30 mcg/0.3 mL dose 2021, 2021    Tdap vaccine, age 7 year and older (BOOSTRIX, ADACEL) 2016       Screenings:  HIV/HCV:  negiatve x2 2021  Pap:  DUE - requesting GYN referral  Mammogram: sister dx with breast cancer last summer- 1 year younger than her and diagnosed last summer DUE - agreeable to alternating mammogram and MRI.  Colonoscopy:  2022 with one 5 mm polyp sessile serrated polyp  DExA:  ***  Lun/2 ppd since age 15.  Pre-contemplative.  19 pack year history - DUE NEXT YEAR IF STILL SMOKING    Meniere's Disease  Managed with triamterene-hydrochlorothiazide 37.5-25 mg daily. Takes Valium for Meniere's disease - vestibular migraines.  Maybe 3 pills per year.    Hx of Depression with Suicidal Ideation:  Hospitalized in 2024.  Seeing therapist regularly.  Coworkers pink-slipped her.  Sees Dr. Rosario with [facility].  Therapist Flora Carlos.            ROS:    Review of systems is essentially negative for all systems except for any identified issues in HPI above.    Objective     /78   Pulse (!) 111   Temp 35.8 °C (96.4 °F)   Ht 1.549 m (5' 1\")   Wt 73.5 kg (162 lb)   SpO2 97%   BMI 30.61 kg/m²      PHYSICAL EXAM    GENERAL  Well-appearing, pleasant and cooperative.  No acute distress.    HEENT  HEAD:   Normocephalic.  Atraumatic.  EYES:  PERRLA.  No scleral icterus or conjunctival injection.  EARS:  Tympanic membranes visualized bilaterally without erythema, fluid, or bulging.  NECK:  No adenopathy.  No palpable thyroid enlargement or nodules.    THROAT:  Moist oropharynx without tonsillar enlargement or exudates.    LUNGS:    Clear to " auscultation bilaterally.  No wheezes, rales, rhonchi.    CARDIAC:  Regular rate and rhythm.  Normal S1S2.  No murmurs/rubs/gallops.    ABDOMEN:  Soft, non-tender, non-distended.  No hepatosplenomegaly.  Normoactive bowel sounds.    MUSCULOSKELETAL:  No gross abnormalities.   No joint swelling or erythema,.  No spinal or paraspinal tenderness to palpation.    EXTREMITIES:  No LE edema or cyanosis.      NEURO           Alert and oriented x3. No focal deficits.    PSYCH:          Affect appropriate.           Assessment/Plan   Problem List Items Addressed This Visit    None  Visit Diagnoses       Annual physical exam    -  Primary    Screening for cardiovascular condition        Vitamin D deficiency        Vaccine counseling                     Hayley Martinez MD         Patient Instructions   It was a pleasure to meet you today.    Pneumonia vaccination (Prevnar 20) in clinic today.  -You are also due for the shingles vaccination series (Shingrix), which you have declined for now.  Feel free to return for a nurse visit when/if you decide to get this vaccine.    Go to the lab for fasting blood work, we will notify you of all results.    Mammogram order entered today, call or schedule at .  -Due to your significant family history of breast cancer, we will alternate breast MRIs every 6-months with your mammogram.  Future MRI order entered today.    Gynecology and pelvic floor physical therapy referrals entered today, call to schedule.    Triamterene-hydrochlorothiazide refill sent to your pharmacy.    I recommend that you continue seeing your psychiatrist as we discussed given hospitalization last year.    Routine follow-up/medication review with me in 6 months, sooner if needed.    Counseling:   Medication education:    Education: The patient is counseled regarding potential side effects of all new medications.    Understanding:  Patient expressed understanding.    Adherence:  No barriers to adherence identified.           Hayley Martinez MD